# Patient Record
Sex: MALE | Race: WHITE | Employment: OTHER | URBAN - METROPOLITAN AREA
[De-identification: names, ages, dates, MRNs, and addresses within clinical notes are randomized per-mention and may not be internally consistent; named-entity substitution may affect disease eponyms.]

---

## 2017-01-02 ENCOUNTER — TRANSFERRED RECORDS (OUTPATIENT)
Dept: HEALTH INFORMATION MANAGEMENT | Facility: CLINIC | Age: 72
End: 2017-01-02

## 2017-01-13 ENCOUNTER — APPOINTMENT (OUTPATIENT)
Dept: GENERAL RADIOLOGY | Facility: CLINIC | Age: 72
DRG: 194 | End: 2017-01-13
Attending: EMERGENCY MEDICINE
Payer: MEDICARE

## 2017-01-13 ENCOUNTER — TELEPHONE (OUTPATIENT)
Dept: PEDIATRICS | Facility: CLINIC | Age: 72
End: 2017-01-13

## 2017-01-13 ENCOUNTER — HOSPITAL ENCOUNTER (INPATIENT)
Facility: CLINIC | Age: 72
LOS: 2 days | Discharge: HOME | DRG: 194 | End: 2017-01-15
Attending: EMERGENCY MEDICINE | Admitting: FAMILY MEDICINE
Payer: MEDICARE

## 2017-01-13 DIAGNOSIS — R63.0 LOSS OF APPETITE: Primary | ICD-10-CM

## 2017-01-13 DIAGNOSIS — R11.2 NAUSEA AND VOMITING, INTRACTABILITY OF VOMITING NOT SPECIFIED, UNSPECIFIED VOMITING TYPE: ICD-10-CM

## 2017-01-13 DIAGNOSIS — J18.9 PNEUMONIA OF RIGHT LOWER LOBE DUE TO INFECTIOUS ORGANISM: ICD-10-CM

## 2017-01-13 PROBLEM — C34.30 LUNG CANCER, LOWER LOBE (H): Status: ACTIVE | Noted: 2017-01-13

## 2017-01-13 PROBLEM — C34.31 MALIGNANT NEOPLASM OF LOWER LOBE OF RIGHT LUNG (H): Status: ACTIVE | Noted: 2017-01-13

## 2017-01-13 LAB
ALBUMIN SERPL-MCNC: 2.8 G/DL (ref 3.4–5)
ALP SERPL-CCNC: 40 U/L (ref 40–150)
ALT SERPL W P-5'-P-CCNC: 22 U/L (ref 0–70)
ANION GAP SERPL CALCULATED.3IONS-SCNC: 13 MMOL/L (ref 3–14)
ANISOCYTOSIS BLD QL SMEAR: SLIGHT
AST SERPL W P-5'-P-CCNC: 23 U/L (ref 0–45)
BASOPHILS # BLD AUTO: 0 10E9/L (ref 0–0.2)
BASOPHILS NFR BLD AUTO: 0.3 %
BILIRUB SERPL-MCNC: 1.3 MG/DL (ref 0.2–1.3)
BUN SERPL-MCNC: 22 MG/DL (ref 7–30)
CALCIUM SERPL-MCNC: 8.9 MG/DL (ref 8.5–10.1)
CHLORIDE SERPL-SCNC: 101 MMOL/L (ref 94–109)
CO2 SERPL-SCNC: 23 MMOL/L (ref 20–32)
CREAT SERPL-MCNC: 0.99 MG/DL (ref 0.66–1.25)
DIFFERENTIAL METHOD BLD: ABNORMAL
EOSINOPHIL # BLD AUTO: 0 10E9/L (ref 0–0.7)
EOSINOPHIL NFR BLD AUTO: 0.3 %
ERYTHROCYTE [DISTWIDTH] IN BLOOD BY AUTOMATED COUNT: 15.6 % (ref 10–15)
GFR SERPL CREATININE-BSD FRML MDRD: 74 ML/MIN/1.7M2
GLUCOSE SERPL-MCNC: 124 MG/DL (ref 70–99)
HCT VFR BLD AUTO: 37.1 % (ref 40–53)
HGB BLD-MCNC: 11.9 G/DL (ref 13.3–17.7)
IMM GRANULOCYTES # BLD: 0.1 10E9/L (ref 0–0.4)
IMM GRANULOCYTES NFR BLD: 1.3 %
INR PPP: 1.24 (ref 0.86–1.14)
LACTATE BLD-SCNC: 1.1 MMOL/L (ref 0.7–2.1)
LIPASE SERPL-CCNC: 46 U/L (ref 73–393)
LYMPHOCYTES # BLD AUTO: 0.6 10E9/L (ref 0.8–5.3)
LYMPHOCYTES NFR BLD AUTO: 15.4 %
MCH RBC QN AUTO: 30 PG (ref 26.5–33)
MCHC RBC AUTO-ENTMCNC: 32.1 G/DL (ref 31.5–36.5)
MCV RBC AUTO: 94 FL (ref 78–100)
MONOCYTES # BLD AUTO: 0.9 10E9/L (ref 0–1.3)
MONOCYTES NFR BLD AUTO: 23.1 %
NEUTROPHILS # BLD AUTO: 2.3 10E9/L (ref 1.6–8.3)
NEUTROPHILS NFR BLD AUTO: 59.6 %
PLATELET # BLD AUTO: 160 10E9/L (ref 150–450)
PLATELET # BLD EST: NORMAL 10*3/UL
POTASSIUM SERPL-SCNC: 3.6 MMOL/L (ref 3.4–5.3)
PROT SERPL-MCNC: 6.8 G/DL (ref 6.8–8.8)
RBC # BLD AUTO: 3.97 10E12/L (ref 4.4–5.9)
RBC MORPH BLD: NORMAL
SODIUM SERPL-SCNC: 137 MMOL/L (ref 133–144)
WBC # BLD AUTO: 3.8 10E9/L (ref 4–11)

## 2017-01-13 PROCEDURE — 25000125 ZZHC RX 250: Performed by: FAMILY MEDICINE

## 2017-01-13 PROCEDURE — 99285 EMERGENCY DEPT VISIT HI MDM: CPT | Performed by: EMERGENCY MEDICINE

## 2017-01-13 PROCEDURE — 96376 TX/PRO/DX INJ SAME DRUG ADON: CPT

## 2017-01-13 PROCEDURE — 71020 XR CHEST 2 VW: CPT

## 2017-01-13 PROCEDURE — 25000308 HC RX OP HPI UCR WEL MED 250 IP 250: Performed by: FAMILY MEDICINE

## 2017-01-13 PROCEDURE — 25000128 H RX IP 250 OP 636: Performed by: EMERGENCY MEDICINE

## 2017-01-13 PROCEDURE — 83605 ASSAY OF LACTIC ACID: CPT | Performed by: EMERGENCY MEDICINE

## 2017-01-13 PROCEDURE — 85610 PROTHROMBIN TIME: CPT | Performed by: EMERGENCY MEDICINE

## 2017-01-13 PROCEDURE — 99285 EMERGENCY DEPT VISIT HI MDM: CPT | Mod: 25

## 2017-01-13 PROCEDURE — 96365 THER/PROPH/DIAG IV INF INIT: CPT

## 2017-01-13 PROCEDURE — 85025 COMPLETE CBC W/AUTO DIFF WBC: CPT | Performed by: EMERGENCY MEDICINE

## 2017-01-13 PROCEDURE — 83690 ASSAY OF LIPASE: CPT | Performed by: EMERGENCY MEDICINE

## 2017-01-13 PROCEDURE — 94640 AIRWAY INHALATION TREATMENT: CPT

## 2017-01-13 PROCEDURE — 12000007 ZZH R&B INTERMEDIATE

## 2017-01-13 PROCEDURE — 87040 BLOOD CULTURE FOR BACTERIA: CPT | Performed by: EMERGENCY MEDICINE

## 2017-01-13 PROCEDURE — 25000125 ZZHC RX 250: Performed by: EMERGENCY MEDICINE

## 2017-01-13 PROCEDURE — 80053 COMPREHEN METABOLIC PANEL: CPT | Performed by: EMERGENCY MEDICINE

## 2017-01-13 PROCEDURE — 99207 ZZC CDG-CHARGE/DX NOT SELECTED BY PROVIDER: CPT | Performed by: FAMILY MEDICINE

## 2017-01-13 PROCEDURE — 99223 1ST HOSP IP/OBS HIGH 75: CPT | Mod: AI | Performed by: FAMILY MEDICINE

## 2017-01-13 PROCEDURE — 96375 TX/PRO/DX INJ NEW DRUG ADDON: CPT

## 2017-01-13 PROCEDURE — 40000275 ZZH STATISTIC RCP TIME EA 10 MIN

## 2017-01-13 PROCEDURE — 96361 HYDRATE IV INFUSION ADD-ON: CPT

## 2017-01-13 RX ORDER — LEVOFLOXACIN 5 MG/ML
500 INJECTION, SOLUTION INTRAVENOUS ONCE
Status: COMPLETED | OUTPATIENT
Start: 2017-01-13 | End: 2017-01-13

## 2017-01-13 RX ORDER — ONDANSETRON 2 MG/ML
4 INJECTION INTRAMUSCULAR; INTRAVENOUS EVERY 30 MIN PRN
Status: COMPLETED | OUTPATIENT
Start: 2017-01-13 | End: 2017-01-14

## 2017-01-13 RX ORDER — PROCHLORPERAZINE 25 MG
12.5 SUPPOSITORY, RECTAL RECTAL EVERY 12 HOURS PRN
Status: DISCONTINUED | OUTPATIENT
Start: 2017-01-13 | End: 2017-01-15 | Stop reason: HOSPADM

## 2017-01-13 RX ORDER — NALOXONE HYDROCHLORIDE 0.4 MG/ML
.1-.4 INJECTION, SOLUTION INTRAMUSCULAR; INTRAVENOUS; SUBCUTANEOUS
Status: DISCONTINUED | OUTPATIENT
Start: 2017-01-13 | End: 2017-01-15 | Stop reason: HOSPADM

## 2017-01-13 RX ORDER — FOLIC ACID 1 MG/1
1 TABLET ORAL DAILY
Status: DISCONTINUED | OUTPATIENT
Start: 2017-01-14 | End: 2017-01-15 | Stop reason: HOSPADM

## 2017-01-13 RX ORDER — LEVOFLOXACIN 5 MG/ML
750 INJECTION, SOLUTION INTRAVENOUS EVERY 24 HOURS
Status: DISCONTINUED | OUTPATIENT
Start: 2017-01-14 | End: 2017-01-14

## 2017-01-13 RX ORDER — PROMETHAZINE HYDROCHLORIDE 25 MG/ML
25 INJECTION, SOLUTION INTRAMUSCULAR; INTRAVENOUS ONCE
Status: COMPLETED | OUTPATIENT
Start: 2017-01-13 | End: 2017-01-13

## 2017-01-13 RX ORDER — ASPIRIN 81 MG/1
81 TABLET ORAL DAILY
Status: DISCONTINUED | OUTPATIENT
Start: 2017-01-14 | End: 2017-01-15 | Stop reason: HOSPADM

## 2017-01-13 RX ORDER — ALBUTEROL SULFATE 0.83 MG/ML
2.5 SOLUTION RESPIRATORY (INHALATION)
Status: DISCONTINUED | OUTPATIENT
Start: 2017-01-13 | End: 2017-01-15 | Stop reason: HOSPADM

## 2017-01-13 RX ORDER — LEVOFLOXACIN 5 MG/ML
500 INJECTION, SOLUTION INTRAVENOUS ONCE
Status: DISCONTINUED | OUTPATIENT
Start: 2017-01-13 | End: 2017-01-13 | Stop reason: CLARIF

## 2017-01-13 RX ORDER — SODIUM CHLORIDE 9 MG/ML
1000 INJECTION, SOLUTION INTRAVENOUS CONTINUOUS
Status: DISCONTINUED | OUTPATIENT
Start: 2017-01-13 | End: 2017-01-15 | Stop reason: HOSPADM

## 2017-01-13 RX ORDER — UREA 10 %
1000 LOTION (ML) TOPICAL DAILY
Status: DISCONTINUED | OUTPATIENT
Start: 2017-01-14 | End: 2017-01-15 | Stop reason: HOSPADM

## 2017-01-13 RX ORDER — LEVOFLOXACIN 5 MG/ML
250 INJECTION, SOLUTION INTRAVENOUS ONCE
Status: DISCONTINUED | OUTPATIENT
Start: 2017-01-13 | End: 2017-01-13

## 2017-01-13 RX ORDER — DEXAMETHASONE 0.5 MG/1
1 TABLET ORAL EVERY OTHER DAY
Status: DISCONTINUED | OUTPATIENT
Start: 2017-01-14 | End: 2017-01-15 | Stop reason: HOSPADM

## 2017-01-13 RX ORDER — PROCHLORPERAZINE MALEATE 5 MG
10 TABLET ORAL EVERY 6 HOURS PRN
Status: DISCONTINUED | OUTPATIENT
Start: 2017-01-13 | End: 2017-01-15 | Stop reason: HOSPADM

## 2017-01-13 RX ORDER — PROCHLORPERAZINE MALEATE 5 MG
5 TABLET ORAL EVERY 6 HOURS PRN
Status: DISCONTINUED | OUTPATIENT
Start: 2017-01-13 | End: 2017-01-15 | Stop reason: HOSPADM

## 2017-01-13 RX ORDER — CHLORAL HYDRATE 500 MG
2 CAPSULE ORAL DAILY
Status: DISCONTINUED | OUTPATIENT
Start: 2017-01-14 | End: 2017-01-15 | Stop reason: HOSPADM

## 2017-01-13 RX ADMIN — LEVOFLOXACIN 250 MG: 5 INJECTION, SOLUTION INTRAVENOUS at 21:57

## 2017-01-13 RX ADMIN — LEVOFLOXACIN 500 MG: 5 INJECTION, SOLUTION INTRAVENOUS at 19:49

## 2017-01-13 RX ADMIN — ONDANSETRON 4 MG: 2 INJECTION INTRAMUSCULAR; INTRAVENOUS at 17:09

## 2017-01-13 RX ADMIN — ONDANSETRON 4 MG: 2 INJECTION INTRAMUSCULAR; INTRAVENOUS at 18:17

## 2017-01-13 RX ADMIN — ALBUTEROL SULFATE 2.5 MG: 2.5 SOLUTION RESPIRATORY (INHALATION) at 22:13

## 2017-01-13 RX ADMIN — PROMETHAZINE HYDROCHLORIDE 25 MG: 25 INJECTION INTRAMUSCULAR; INTRAVENOUS at 19:20

## 2017-01-13 RX ADMIN — SODIUM CHLORIDE 1000 ML: 9 INJECTION, SOLUTION INTRAVENOUS at 18:42

## 2017-01-13 RX ADMIN — SODIUM CHLORIDE 1000 ML: 9 INJECTION, SOLUTION INTRAVENOUS at 17:04

## 2017-01-13 ASSESSMENT — ENCOUNTER SYMPTOMS
FATIGUE: 1
ACTIVITY CHANGE: 1
CHILLS: 1
COUGH: 1
ABDOMINAL PAIN: 1
LIGHT-HEADEDNESS: 1
NAUSEA: 1
WEAKNESS: 1
DIARRHEA: 1
VOMITING: 1
APPETITE CHANGE: 1

## 2017-01-13 NOTE — TELEPHONE ENCOUNTER
Patient called stating he started with flu like symptoms on Tuesday, slept all day Wednesday, today he has chills off and on, nausea, vomit. Able to keep water down, but no food.     Mariya RODRIGUEZ  Station

## 2017-01-13 NOTE — IP AVS SNAPSHOT
MRN:[id number]                      After Visit Summary   1/13/2017    Juan Choudhary    MRN: [id number]           Thank you!     Thank you for choosing [facility] for your care. Our goal is always to provide you with excellent care. Hearing back from our patients is one way we can continue to improve our services. Please take a few minutes to complete the written survey that you may receive in the mail after you visit with us. Thank you!        Patient Information     Date Of Birth          1945        About your hospital stay     You were admitted on:  January 13, 2017 You last received care in the:  [facility]    You were discharged on:  January 15, 2017        Reason for your hospital stay       Pneumonia right lung                  Who to Call     For medical emergencies, please call 911.  For non-urgent questions about your medical care, please call your primary care provider or clinic, [phone]          Attending Provider     Provider    Riley Nicholson MD Akintola, Olutoyin Enitan, MD       Primary Care Provider Office Phone # Fax #    GIRISH Soto [phone]       [facility] [id number] [place] [address]        After Care Instructions     Activity       Your activity upon discharge: activity as tolerated            Diet       Follow this diet upon discharge: Orders Placed This Encounter  Advance Diet as Tolerated: Clear Liquid Diet                  Follow-up Appointments     Follow-up and recommended labs and tests        Follow up with primary care provider, GIRISH Soto, within 7 days for hospital follow- up.  No follow up labs or test are needed.                  Your next 10 appointments already scheduled     Jan 20, 2017  2:40 PM   Office Visit with Andrea Chau MD   [facility] ([facility])    [address] [address] [phone]           Bring a  "current list of meds and any records pertaining to this visit.  For Physicals, please bring immunization records and any forms needing to be filled out.  Please arrive 10 minutes early to complete paperwork.              Pending Results     Date and Time Order Name Status Description    1/13/2017 1921 Blood culture Preliminary     1/13/2017 1921 Blood culture Preliminary             Statement of Approval     Ordered          01/15/17 1349  I have reviewed and agree with all the recommendations and orders detailed in this document.   EFFECTIVE NOW     Approved and electronically signed by:  Meghann Esqueda MD             Admission Information        Provider Department Dept Phone    1/13/2017 Meghann Esqueda MD [facility] [phone]      Your Vitals Were     Blood Pressure Pulse Temperature    142/86 mmHg 69 99.2  F (37.3  C) (Oral)    Respirations Height Weight    18 1.88 m (6' 2\") 98 kg (216 lb 0.8 oz)    BMI (Body Mass Index) Pulse Oximetry       27.73 kg/m2 97%       Care EveryWhere ID     This is your Care EveryWhere ID. This could be used by other organizations to access your [facility] medical records  [id number]           Review of your medicines      START taking        Dose / Directions    levofloxacin 500 MG tablet   Commonly known as:  LEVAQUIN   Used for:  Pneumonia of right lower lobe due to infectious organism        Dose:  500 mg   Take 1 tablet (500 mg) by mouth daily   Quantity:  5 tablet   Refills:  0       mirtazapine 15 MG tablet   Commonly known as:  REMERON   Used for:  Loss of appetite        Dose:  15 mg   Take 1 tablet (15 mg) by mouth At Bedtime   Quantity:  30 tablet   Refills:  1         CONTINUE these medicines which have NOT CHANGED        Dose / Directions    ASPIRIN EC PO        Dose:  81 mg   Take 81 mg by mouth daily   Refills:  0       B-12 1000 MCG Caps        Dose:  1000 mcg   Take 1,000 mcg by mouth daily   Refills:  0       DECADRON PO        " Dose:  1 mg   Take 1 mg by mouth every other day   Refills:  0       fish oil-omega-3 fatty acids 1000 MG capsule        Dose:  2 g   Take 2 g by mouth daily   Refills:  0       folic acid 1 MG tablet   Commonly known as:  FOLVITE        Dose:  1 mg   Take 1 mg by mouth daily.   Refills:  0       NEW MED        Tart cherry juice  1oz daily   Refills:  0       PROCHLORPERAZINE MALEATE PO        Dose:  10 mg   Take 10 mg by mouth as needed for nausea or vomiting   Refills:  0       TAXOTERE IV        Refills:  0       VITAMIN D PO        Dose:  1000 Units   Take 1,000 Units by mouth daily   Refills:  0            Where to get your medicines      These medications were sent to [facility] [place], [place] - [address]  [address] [address]     Phone:  [phone]    - levofloxacin 500 MG tablet  - mirtazapine 15 MG tablet             Protect others around you: Learn how to safely use, store and throw away your medicines at www.disposemymeds.org.             Medication List: This is a list of all your medications and when to take them. Check marks below indicate your daily home schedule. Keep this list as a reference.      Medications           Morning Afternoon Evening Bedtime As Needed    ASPIRIN EC PO   Take 81 mg by mouth daily   Last time this was given:  81 mg on 1/15/2017  8:46 AM                                B-12 1000 MCG Caps   Take 1,000 mcg by mouth daily                                DECADRON PO   Take 1 mg by mouth every other day   Last time this was given:  1 mg on 1/14/2017  8:06 AM                                fish oil-omega-3 fatty acids 1000 MG capsule   Take 2 g by mouth daily   Last time this was given:  2 g on 1/14/2017  8:07 AM                                folic acid 1 MG tablet   Commonly known as:  FOLVITE   Take 1 mg by mouth daily.   Last time this was given:  1 mg on 1/15/2017  8:47 AM                                levofloxacin 500 MG tablet    Commonly known as:  LEVAQUIN   Take 1 tablet (500 mg) by mouth daily                                mirtazapine 15 MG tablet   Commonly known as:  REMERON   Take 1 tablet (15 mg) by mouth At Bedtime                                NEW MED   Tart cherry juice  1oz daily                                PROCHLORPERAZINE MALEATE PO   Take 10 mg by mouth as needed for nausea or vomiting                                TAXOTERE IV                                VITAMIN D PO   Take 1,000 Units by mouth daily   Last time this was given:  1,000 Units on 1/15/2017  8:46 AM

## 2017-01-13 NOTE — TELEPHONE ENCOUNTER
Patient states he has had nausea/vomiting, diarrhea, with chills off and on, and weakness since Tuesday. He is able to keep down some water and is still urinating but just feels very weak. Has not been able to keep down food since Tuesday.  He denies any chest pain, sore throat, headache or cough. He will get a ride to the UC[facility] to be assessed and possibly get fluids.    Arin Bautista RN

## 2017-01-13 NOTE — IP AVS SNAPSHOT
[facility]    [address] [address]    Phone:  [phone]    Fax:  [phone]                                       After Visit Summary   1/13/2017    Juan Choudhary    MRN: [id number]           After Visit Summary Signature Page     I have received my discharge instructions, and my questions have been answered. I have discussed any challenges I see with this plan with the nurse or doctor.    ..........................................................................................................................................  Patient/Patient Representative Signature      ..........................................................................................................................................  Patient Representative Print Name and Relationship to Patient    ..................................................               ................................................  Date                                            Time    ..........................................................................................................................................  Reviewed by Signature/Title    ...................................................              ..............................................  Date                                                            Time

## 2017-01-13 NOTE — ED PROVIDER NOTES
History     Chief Complaint   Patient presents with     Abdominal Pain     N/V/D generalized weakness     HPI  Juan Choudhary is a 71 year old male with a history of non-small cell lung cancer who presents to the ED today with complaints of nausea and vomiting. This started Tuesday 1/10/17 when he came in from the cold and felt like he couldn't warm up. He had the chills and went to bed early. All day Wednesday he slept, did not eat, and drank ice water. Thursday he stayed in bed most of the day, and tried to eat soup in the evening, but he vomited that up. He has been nauseous and vomiting since then. He also has had dark diarrhea, but is still urinating normally. He has had a moderate cough and has noted mild edema in his legs.  He complains of lightheadedness and weakness since this started.  He denies an fever, cough, SOB, chest pain, abdominal pain, history of abdominal surgeries or problems, or hematuria.   Of note, he has had a colonoscopy in 2006 that came back completely normal. He also had a dose of chemo therapy a weak and a half ago.       I have reviewed the Medications, Allergies, Past Medical and Surgical History, and Social History in the [employer] system.  Past Medical History   Diagnosis Date     Esophageal reflux      Gastroesophageal reflux     Patient Active Problem List   Diagnosis     ESOPHAGEAL REFLUX     Internal bleeding hemorrhoids     CARDIOVASCULAR SCREENING; LDL GOAL LESS THAN 160     Adenocarcinoma of lung (H)     Advanced directives, counseling/discussion     Health Care Home     Current Facility-Administered Medications   Medication     0.9% sodium chloride infusion     ondansetron (ZOFRAN) injection 4 mg     levofloxacin (LEVAQUIN) infusion 500 mg     Current Outpatient Prescriptions   Medication     DOCEtaxel (TAXOTERE IV)     Dexamethasone (DECADRON PO)     vitamin  B complex with vitamin C (VITAMIN  B COMPLEX) TABS     fish oil-omega-3 fatty acids 1000 MG capsule     GLUCOSAMINE  SULFATE PO     Cholecalciferol (VITAMIN D PO)     folic acid (FOLVITE) 1 MG tablet     ranitidine (ZANTAC) 150 MG tablet     NEW MED     senna-docusate (SENOKOT-S;PERICOLACE) 8.6-50 MG per tablet     PROCHLORPERAZINE MALEATE PO     NEW MED        Allergies   Allergen Reactions     Penicillins      Mild rash, cephlaosporins okay     Prilosec [Omeprazole]      Legs itching     Sulfa Drugs      Social History     Social History     Marital Status:      Spouse Name: N/A     Number of Children: N/A     Years of Education: N/A     Occupational History     Not on file.     Social History Main Topics     Smoking status: Never Smoker      Smokeless tobacco: Never Used     Alcohol Use: Yes      Comment: socially     Drug Use: No     Sexual Activity: No     Other Topics Concern     Not on file     Social History Narrative     Family History   Problem Relation Age of Onset     CANCER Mother      lung     HEART DISEASE Father      HEART DISEASE Sister      Prostate Cancer Brother      Melanoma No family hx of         Review of Systems   Constitutional: Positive for chills, activity change, appetite change (decreased) and fatigue.   Respiratory: Positive for cough.    Cardiovascular: Positive for leg swelling.   Gastrointestinal: Positive for nausea, vomiting, abdominal pain and diarrhea.   Neurological: Positive for weakness and light-headedness.     All other systems reviewed and are negative.    Physical Exam   BP: 128/87 mmHg  Pulse: 85  Heart Rate: 85  Temp: 97.8  F (36.6  C)  Resp: 20  SpO2: 96 %  Physical Exam Gen. alert cooperative male in moderate distress.  Facial pallor.  HEENT reveals alopecia.  No scleral icterus.  Oral mucosa is moist.  Neck is supple.  Lungs reveal rhonchi at the right base.  No wheezes part.  Cardiac regular rate without murmur.  Back no CVA tenderness.  Abdomen reveals active bowel sounds and palpation and he is tender in the epigastrium without guarding or rebound.  There is no  organomegaly noted.  No skin rashes.  Extremities reveal no pitting edema.  Negative Homans.  Neurologic nonfocal.    ED Course   Procedures         Results for orders placed or performed during the hospital encounter of 01/13/17   XR Chest 2 Views    Narrative    CHEST TWO VIEWS  1/13/2017 5:51 PM     HISTORY: Cough. Generalized weakness.    COMPARISON: 6/5/2015.      Impression    IMPRESSION: Infiltrate and consolidation at the right lung base is  suspicious for pneumonia. This finding has increased in prominence  since 6/5/2015. A pulmonary mass in this location is considered less  likely from this appearance, however follow-up after treatment is  recommended to ensure resolution of this finding. CT should also be  considered for further characterization. The left lung is clear. There  is a small right pleural effusion.            Critical Care time:  none               Labs Ordered and Resulted from Time of ED Arrival Up to the Time of Departure from the ED   CBC WITH PLATELETS DIFFERENTIAL - Abnormal; Notable for the following:     WBC 3.8 (*)     RBC Count 3.97 (*)     Hemoglobin 11.9 (*)     Hematocrit 37.1 (*)     RDW 15.6 (*)     Absolute Lymphocytes 0.6 (*)     All other components within normal limits   COMPREHENSIVE METABOLIC PANEL - Abnormal; Notable for the following:     Glucose 124 (*)     Albumin 2.8 (*)     All other components within normal limits   LIPASE - Abnormal; Notable for the following:     Lipase 46 (*)     All other components within normal limits   INR - Abnormal; Notable for the following:     INR 1.24 (*)     All other components within normal limits   LACTIC ACID WHOLE BLOOD   URINE MACROSCOPIC WITH REFLEX TO MICRO   BLOOD CULTURE   BLOOD CULTURE     Results for orders placed or performed during the hospital encounter of 01/13/17 (from the past 24 hour(s))   CBC with platelets differential   Result Value Ref Range    WBC 3.8 (L) 4.0 - 11.0 10e9/L    RBC Count 3.97 (L) 4.4 - 5.9  10e12/L    Hemoglobin 11.9 (L) 13.3 - 17.7 g/dL    Hematocrit 37.1 (L) 40.0 - 53.0 %    MCV 94 78 - 100 fl    MCH 30.0 26.5 - 33.0 pg    MCHC 32.1 31.5 - 36.5 g/dL    RDW 15.6 (H) 10.0 - 15.0 %    Platelet Count 160 150 - 450 10e9/L    Diff Method Automated Method     % Neutrophils 59.6 %    % Lymphocytes 15.4 %    % Monocytes 23.1 %    % Eosinophils 0.3 %    % Basophils 0.3 %    % Immature Granulocytes 1.3 %    Absolute Neutrophil 2.3 1.6 - 8.3 10e9/L    Absolute Lymphocytes 0.6 (L) 0.8 - 5.3 10e9/L    Absolute Monocytes 0.9 0.0 - 1.3 10e9/L    Absolute Eosinophils 0.0 0.0 - 0.7 10e9/L    Absolute Basophils 0.0 0.0 - 0.2 10e9/L    Abs Immature Granulocytes 0.1 0 - 0.4 10e9/L    Anisocytosis Slight     RBC Morphology Normal     Platelet Estimate Normal    Comprehensive metabolic panel   Result Value Ref Range    Sodium 137 133 - 144 mmol/L    Potassium 3.6 3.4 - 5.3 mmol/L    Chloride 101 94 - 109 mmol/L    Carbon Dioxide 23 20 - 32 mmol/L    Anion Gap 13 3 - 14 mmol/L    Glucose 124 (H) 70 - 99 mg/dL    Urea Nitrogen 22 7 - 30 mg/dL    Creatinine 0.99 0.66 - 1.25 mg/dL    GFR Estimate 74 >60 mL/min/1.7m2    GFR Estimate If Black 89 >60 mL/min/1.7m2    Calcium 8.9 8.5 - 10.1 mg/dL    Bilirubin Total 1.3 0.2 - 1.3 mg/dL    Albumin 2.8 (L) 3.4 - 5.0 g/dL    Protein Total 6.8 6.8 - 8.8 g/dL    Alkaline Phosphatase 40 40 - 150 U/L    ALT 22 0 - 70 U/L    AST 23 0 - 45 U/L   Lipase   Result Value Ref Range    Lipase 46 (L) 73 - 393 U/L   Lactic acid whole blood   Result Value Ref Range    Lactic Acid 1.1 0.7 - 2.1 mmol/L   INR   Result Value Ref Range    INR 1.24 (H) 0.86 - 1.14   XR Chest 2 Views    Narrative    CHEST TWO VIEWS  1/13/2017 5:51 PM     HISTORY: Cough. Generalized weakness.    COMPARISON: 6/5/2015.      Impression    IMPRESSION: Infiltrate and consolidation at the right lung base is  suspicious for pneumonia. This finding has increased in prominence  since 6/5/2015. A pulmonary mass in this location is  considered less  likely from this appearance, however follow-up after treatment is  recommended to ensure resolution of this finding. CT should also be  considered for further characterization. The left lung is clear. There  is a small right pleural effusion.       Medications   0.9% sodium chloride BOLUS (0 mLs Intravenous Stopped 1/13/17 1841)     Followed by   0.9% sodium chloride infusion (1,000 mLs Intravenous New Bag 1/13/17 1842)   ondansetron (ZOFRAN) injection 4 mg (4 mg Intravenous Given 1/13/17 1817)   levofloxacin (LEVAQUIN) infusion 500 mg (not administered)   promethazine (PHENERGAN) IV injection 25 mg (25 mg Intravenous Given 1/13/17 1920)       4:50 PM Patient Assessed    Patient was given IV fluids, antiemetics, blood work was ordered and chest x-rays were ordered.  Assessments & Plan (with Medical Decision Making)   Patient is a 71-year-old male presents with 4 days of generalized weakness, coarse cough, nausea and vomiting.  Patient denies dizziness but feels weak when standing.  He has no chest pain and has had mild shortness of breath.  Cough is nonproductive.  He has lung cancer and is currently going through chemotherapy with last therapy week ago.  Patient denies significant diarrhea.  He's had no dysuria.  No significant abdominal pain but on exam he has tenderness in the epigastrium.  No guarding or rebound.  With blood work there is no evidence of hepatitis, biliary disease or pancreatitis.  His lipase was normal.  On lung exam he did have walk at the right base.  X-ray of the chest shows infiltrate at the right base consistent with exam.  Patient was given IV fluids and required multiple doses of antiemetics.  He has a penicillin allergy so he is given IV Levaquin.  Blood cultures were sent prior to initiation of antibiotics.  With newly diagnosed pneumonia, having lung cancer, currently treated with chemotherapy, and persistent cough and nausea will permit the patient for additional IV  fluids, antiemetics and antibiotics.  Patient's white count is 3.8 with a normal differential.  Doubt neutropenic but can follow serial white counts if necessary.  Dr. Esqueda was apprised and will follow on admission.  I have reviewed the nursing notes.    I have reviewed the findings, diagnosis, plan and need for follow up with the patient.    New Prescriptions    No medications on file       Final diagnoses:   Pneumonia of right lower lobe due to infectious organism   Nausea and vomiting, intractability of vomiting not specified, unspecified vomiting type     This document serves as a record of the services and decisions personally performed and made by Riley Nicholson MD. It was created on HIS/HER behalf by Roselyn Cox, a trained medical scribe. The creation of this document is based the provider's statements to the medical scribe.  Roselyn Cox 4:50 PM 1/13/2017    Provider:   The information in this document, created by the medical scribe for me, accurately reflects the services I personally performed and the decisions made by me. I have reviewed and approved this document for accuracy prior to leaving the patient care area.  Riley Nicholson MD 4:50 PM 1/13/2017 1/13/2017   [facility] EMERGENCY DEPARTMENT      Riley Nicholson MD  01/13/17 2007    Riley Nicholson MD  01/13/17 2008

## 2017-01-14 LAB
ALBUMIN UR-MCNC: 30 MG/DL
ANION GAP SERPL CALCULATED.3IONS-SCNC: 9 MMOL/L (ref 3–14)
ANISOCYTOSIS BLD QL SMEAR: SLIGHT
APPEARANCE UR: CLEAR
BASOPHILS # BLD AUTO: 0 10E9/L (ref 0–0.2)
BASOPHILS NFR BLD AUTO: 0 %
BILIRUB UR QL STRIP: NEGATIVE
BUN SERPL-MCNC: 17 MG/DL (ref 7–30)
CALCIUM SERPL-MCNC: 8.1 MG/DL (ref 8.5–10.1)
CHLORIDE SERPL-SCNC: 107 MMOL/L (ref 94–109)
CO2 SERPL-SCNC: 26 MMOL/L (ref 20–32)
COLOR UR AUTO: YELLOW
CREAT SERPL-MCNC: 0.92 MG/DL (ref 0.66–1.25)
DIFFERENTIAL METHOD BLD: ABNORMAL
EOSINOPHIL # BLD AUTO: 0 10E9/L (ref 0–0.7)
EOSINOPHIL NFR BLD AUTO: 0.2 %
ERYTHROCYTE [DISTWIDTH] IN BLOOD BY AUTOMATED COUNT: 15.2 % (ref 10–15)
GFR SERPL CREATININE-BSD FRML MDRD: 81 ML/MIN/1.7M2
GLUCOSE SERPL-MCNC: 95 MG/DL (ref 70–99)
GLUCOSE UR STRIP-MCNC: NEGATIVE MG/DL
HCT VFR BLD AUTO: 31.9 % (ref 40–53)
HGB BLD-MCNC: 10.2 G/DL (ref 13.3–17.7)
HGB UR QL STRIP: NEGATIVE
IMM GRANULOCYTES # BLD: 0.1 10E9/L (ref 0–0.4)
IMM GRANULOCYTES NFR BLD: 1.5 %
KETONES UR STRIP-MCNC: 80 MG/DL
LEUKOCYTE ESTERASE UR QL STRIP: NEGATIVE
LYMPHOCYTES # BLD AUTO: 0.7 10E9/L (ref 0.8–5.3)
LYMPHOCYTES NFR BLD AUTO: 17.7 %
MCH RBC QN AUTO: 29.9 PG (ref 26.5–33)
MCHC RBC AUTO-ENTMCNC: 32 G/DL (ref 31.5–36.5)
MCV RBC AUTO: 94 FL (ref 78–100)
MONOCYTES # BLD AUTO: 0.8 10E9/L (ref 0–1.3)
MONOCYTES NFR BLD AUTO: 20.9 %
MUCOUS THREADS #/AREA URNS LPF: PRESENT /LPF
NEUTROPHILS # BLD AUTO: 2.4 10E9/L (ref 1.6–8.3)
NEUTROPHILS NFR BLD AUTO: 59.7 %
NITRATE UR QL: NEGATIVE
PH UR STRIP: 5.5 PH (ref 5–7)
PLATELET # BLD AUTO: 139 10E9/L (ref 150–450)
PLATELET # BLD EST: ABNORMAL 10*3/UL
POTASSIUM SERPL-SCNC: 3.6 MMOL/L (ref 3.4–5.3)
RBC # BLD AUTO: 3.41 10E12/L (ref 4.4–5.9)
RBC #/AREA URNS AUTO: 1 /HPF (ref 0–2)
RBC MORPH BLD: NORMAL
SODIUM SERPL-SCNC: 142 MMOL/L (ref 133–144)
SP GR UR STRIP: 1.02 (ref 1–1.03)
URN SPEC COLLECT METH UR: ABNORMAL
UROBILINOGEN UR STRIP-MCNC: NORMAL MG/DL (ref 0–2)
WBC # BLD AUTO: 4 10E9/L (ref 4–11)
WBC #/AREA URNS AUTO: 1 /HPF (ref 0–2)

## 2017-01-14 PROCEDURE — 25000132 ZZH RX MED GY IP 250 OP 250 PS 637: Mod: GY | Performed by: FAMILY MEDICINE

## 2017-01-14 PROCEDURE — 25000308 HC RX OP HPI UCR WEL MED 250 IP 250: Performed by: FAMILY MEDICINE

## 2017-01-14 PROCEDURE — 99207 ZZC CDG-CHARGE/DX NOT SELECTED BY PROVIDER: CPT | Performed by: FAMILY MEDICINE

## 2017-01-14 PROCEDURE — A9270 NON-COVERED ITEM OR SERVICE: HCPCS | Mod: GY | Performed by: FAMILY MEDICINE

## 2017-01-14 PROCEDURE — 94640 AIRWAY INHALATION TREATMENT: CPT | Mod: 76

## 2017-01-14 PROCEDURE — 25000128 H RX IP 250 OP 636: Performed by: EMERGENCY MEDICINE

## 2017-01-14 PROCEDURE — 25000125 ZZHC RX 250: Performed by: FAMILY MEDICINE

## 2017-01-14 PROCEDURE — 12000000 ZZH R&B MED SURG/OB

## 2017-01-14 PROCEDURE — 85025 COMPLETE CBC W/AUTO DIFF WBC: CPT | Performed by: FAMILY MEDICINE

## 2017-01-14 PROCEDURE — 25000125 ZZHC RX 250: Performed by: EMERGENCY MEDICINE

## 2017-01-14 PROCEDURE — 36415 COLL VENOUS BLD VENIPUNCTURE: CPT | Performed by: FAMILY MEDICINE

## 2017-01-14 PROCEDURE — 81001 URINALYSIS AUTO W/SCOPE: CPT | Performed by: FAMILY MEDICINE

## 2017-01-14 PROCEDURE — 99232 SBSQ HOSP IP/OBS MODERATE 35: CPT | Performed by: FAMILY MEDICINE

## 2017-01-14 PROCEDURE — 94640 AIRWAY INHALATION TREATMENT: CPT

## 2017-01-14 PROCEDURE — 12000007 ZZH R&B INTERMEDIATE

## 2017-01-14 PROCEDURE — 80048 BASIC METABOLIC PNL TOTAL CA: CPT | Performed by: FAMILY MEDICINE

## 2017-01-14 RX ORDER — MIRTAZAPINE 15 MG/1
15 TABLET, FILM COATED ORAL AT BEDTIME
Status: DISCONTINUED | OUTPATIENT
Start: 2017-01-14 | End: 2017-01-15 | Stop reason: HOSPADM

## 2017-01-14 RX ORDER — LEVOFLOXACIN 5 MG/ML
750 INJECTION, SOLUTION INTRAVENOUS EVERY 24 HOURS
Status: DISCONTINUED | OUTPATIENT
Start: 2017-01-14 | End: 2017-01-15 | Stop reason: HOSPADM

## 2017-01-14 RX ADMIN — Medication 1000 MCG: at 08:03

## 2017-01-14 RX ADMIN — ALBUTEROL SULFATE 2.5 MG: 2.5 SOLUTION RESPIRATORY (INHALATION) at 15:47

## 2017-01-14 RX ADMIN — SODIUM CHLORIDE 1000 ML: 9 INJECTION, SOLUTION INTRAVENOUS at 23:27

## 2017-01-14 RX ADMIN — Medication 2 G: at 08:07

## 2017-01-14 RX ADMIN — SODIUM CHLORIDE 1000 ML: 9 INJECTION, SOLUTION INTRAVENOUS at 00:03

## 2017-01-14 RX ADMIN — ASPIRIN 81 MG: 81 TABLET, COATED ORAL at 08:02

## 2017-01-14 RX ADMIN — LEVOFLOXACIN 750 MG: 5 INJECTION, SOLUTION INTRAVENOUS at 19:49

## 2017-01-14 RX ADMIN — ONDANSETRON 4 MG: 2 INJECTION INTRAMUSCULAR; INTRAVENOUS at 06:56

## 2017-01-14 RX ADMIN — VITAMIN D, TAB 1000IU (100/BT) 1000 UNITS: 25 TAB at 08:06

## 2017-01-14 RX ADMIN — DEXAMETHASONE 1 MG: 0.5 TABLET ORAL at 08:06

## 2017-01-14 RX ADMIN — FOLIC ACID 1 MG: 1 TABLET ORAL at 08:08

## 2017-01-14 RX ADMIN — ALBUTEROL SULFATE 2.5 MG: 2.5 SOLUTION RESPIRATORY (INHALATION) at 21:07

## 2017-01-14 RX ADMIN — ALBUTEROL SULFATE 2.5 MG: 2.5 SOLUTION RESPIRATORY (INHALATION) at 08:22

## 2017-01-14 RX ADMIN — SODIUM CHLORIDE 1000 ML: 9 INJECTION, SOLUTION INTRAVENOUS at 08:03

## 2017-01-14 NOTE — PROGRESS NOTES
[facility] ADMISSION NOTE    Patient admitted to room 2300 at approximately 2140 via cart from emergency room. Patient was accompanied by transport tech.     Verbal SBAR report received from RN prior to patient arrival.     Patient ambulated to bed with stand-by assist. Patient alert and oriented X 3. The patient is not having any pain. 0-10 Pain Scale: 5. Admission vital signs: Blood pressure 131/87, pulse 86, temperature 98.3  F (36.8  C), temperature source Oral, resp. rate 12, SpO2 96 %. Patient was oriented to plan of care, call light, bed controls, tv, telephone, bathroom and visiting hours.     The following safety risks were identified during admission: fall. Yellow risk band applied: YES.     Jenny Crain

## 2017-01-14 NOTE — PROGRESS NOTES
"CLINICAL NUTRITION SERVICES  -  ASSESSMENT NOTE    RECOMMENDATIONS FOR MD/PROVIDER TO ORDER: advance diet when medically appropriate   Recommendations Ordered by Registered Dietitian (RD): none   Future/Additional Recommendations: encourage pt to order bedtime snack   Malnutrition: severe     REASON FOR ASSESSMENT  Juan Choudhary is a 71 year old male seen by Registered Dietitian for Provider Order - poor oral intake    NUTRITION HISTORY  - Information obtained from patient.  Pt states appetite and PO intake were normal prior to Tuesday.  Pt states he usually consumes 3 meals and a bedtime snack at home.  Pt states he has only had a few bites of food since Tuesday (applesauce, soup) secondary to nausea/vomiting.  Pt states his usual weight is ~220lbs and feels he lost ~10lbs since Tuesday.      CURRENT NUTRITION ORDERS  Diet Order:     Clear Liquid.  Pt states he tolerated ~50% of breakfast tray and ~25% of lunch tray.  Pt feels his nausea is improved and appetite is slightly improved.      PHYSICAL FINDINGS  Observed  No nutrition-related physical findings observed  Obtained from Chart/Interdisciplinary Team  None noted    ANTHROPOMETRICS  Height: 6' 2\"  Weight: 209 lbs 3.46 oz  Body mass index is 26.85 kg/(m^2).  Weight Status:  Overweight BMI 25-29.9  IBW: 190lbs  % IBW: 110%  Weight History:   Wt Readings from Last 5 Encounters:   01/14/17 94.9 kg (209 lb 3.5 oz)   02/22/16 99.338 kg (219 lb)   12/11/15 99.428 kg (219 lb 3.2 oz)   09/28/15 97.523 kg (215 lb)   04/02/15 98.34 kg (216 lb 12.8 oz)     Pt has lost ~5% of body weight in past week, per his recall of usual weight of 220lbs.    LABS  Labs reviewed    MEDICATIONS  Medications reviewed  remeron and decadron - both may help improve pt appetite.  Noted vitamins of D3, B12, folic acid and fish oil.  IVF@125mL/hr    Dosing Weight 95kg    ASSESSED NUTRITION NEEDS:  Estimated Energy Needs: 2446-3462 kcals ([place] [employer] Jeor)  Justification: maintenance  Estimated " Protein Needs:  grams protein (1-1.2 g pro/Kg)  Justification: maintenance and hypercatabolism with acute illness  Estimated Fluid Needs: 7358-6252  mL (1 mL/Kcal)  Justification: maintenance    MALNUTRITION:  % Weight Loss:  > 2% in 1 week (severe malnutrition)  % Intake:  </= 50% for >/= 5 days (severe malnutrition)  Subcutaneous Fat Loss:  None observed  Muscle Loss:  None observed  Fluid Retention:  None noted    Malnutrition Diagnosis: Severe malnutrition  In Context of:  Acute illness or injury    NUTRITION DIAGNOSIS:  Inadequate protein-energy intake related to nausea/vomiting as evidenced by minimal PO intake and weight loss of ~10lb since Tuesday    NUTRITION INTERVENTIONS  Recommendations / Nutrition Prescription  Advance diet as medically able  Encourage frequent meals and snacks  Encourage pt to order a bedtime snack of his choice  Consider nutrition supplements if PO intake remains poor after diet advancement    Implementation  Nutrition education: brief education on importance of frequent meals and snacks  General/healthful diet    Nutrition Goals  Pt to consume >75% of most meals and snacks in next 48 hours    MONITORING AND EVALUATION:  Diet Order, Food intake and Weight    Elsie Whalen MS, RDN, LD, CLT  Clinical Dietitian

## 2017-01-14 NOTE — PLAN OF CARE
Problem: Pneumonia (Adult)  Goal: Signs and Symptoms of Listed Potential Problems Will be Absent or Manageable (Pneumonia)  Signs and symptoms of listed potential problems will be absent or manageable by discharge/transition of care (reference Pneumonia (Adult) CPG).   Outcome: Improving  patient states breathing better today sat on room air 91 to 95 % lung sounds few crackles on the right side will continue to monitor

## 2017-01-14 NOTE — PROGRESS NOTES
[facility] Practice Progress Note           Assessment and Plan:   Principal Problem:  Right lower lobe pneumonia  Assessment: 71 yr old male with right lower pneumonia  Plan: Levaquin 750 mg , Neb treatment.  1/14/2017 - On Levaquin 750 mg daily       Active Problems:  Adenocarcinoma of lung (H)  Assessment: 71 yr old male with adenocarcinoma of Lung here with pneumonia  Plan: Continue with treatment  1/14/2017 - No change.        Malignant neoplasm of lower lobe of right lung (H)  Assessment: 71 yr old male here for pneumonia  Plan: IV antibiotics ( Levaquin . IV fluids, neb treatment. Oxygen as needed.  1/14/2017 - Continue Levaquin ,neb treatments.      Disposition- Fair  Anticipate 2-3 nights of admission  DVT prophylaxis- Mechanical.         Interval History:   Continues to improve.  Vital signs generally better.  Eating and voiding well.  Tolerating medications without significant side effects.  No new concerns today.  Nausea better,no fevers, oxygen within normal limits. Appetite is still poor.will add Remeron to see if this will help to boost appetite.             Significant Problems:   Principal Problem:    Right lower lobe pneumonia  Active Problems:    Adenocarcinoma of lung (H)    Malignant neoplasm of lower lobe of right lung (H)    Lung cancer, lower lobe (H)             Review of Systems:   The Review of Systems is negative other than noted in the HPI            Medications:       levofloxacin  750 mg Intravenous Q24H     mirtazapine  15 mg Oral At Bedtime     aspirin EC EC tablet 81 mg  81 mg Oral Daily     cholecalciferol  1,000 Units Oral Daily     cyanocolbalamin  1,000 mcg Oral Daily     dexamethasone  1 mg Oral Every Other Day     fish oil-omega-3 fatty acids  2 g Oral Daily     folic acid  1 mg Oral Daily     albuterol  2.5 mg Nebulization 4x daily             Physical Exam:   Vitals were reviewed  Temp: 98.8  F (37.1  C) Temp src: Oral BP: 118/55 mmHg Pulse: 78 Heart Rate: 81  Resp: 18 SpO2: 96 % O2 Device: None (Room air)    Blood pressure range: Systolic (24hrs), Av mmHg, Min:118 mmHg, Max:158 mmHg  Blood pressure range: Diastolic (24hrs), Av mmHg, Min:55 mmHg, Max:104 mmHg    Intake/Output Summary (Last 24 hours) at 17 1045  Last data filed at 17 1000   Gross per 24 hour   Intake 2382.92 ml   Output      0 ml   Net 2382.92 ml     Constitutional:   awake, alert, cooperative, no apparent distress, and appears stated age     Lungs:   No increased work of breathing, good air exchange, clear to auscultation bilaterally, no crackles or wheezing     Cardiovascular:   Normal apical impulse, regular rate and rhythm, normal S1 and S2, no S3 or S4, and no murmur noted     Abdomen:   No scars, normal bowel sounds, soft, non-distended, non-tender, no masses palpated, no hepatosplenomegally     Musculoskeletal:   no lower extremity pitting edema present     Neuropsychiatric:   General: normal, calm and normal eye contact  Level of consciousness: alert / normal  Affect: normal     Skin:   no bruising or bleeding             Data:     No results found for: NTBNPI, NTBNP  Lab Results   Component Value Date    WBC 4.0 2017    WBC 3.8* 2017    WBC 4.4 2015    HGB 10.2* 2017    HGB 11.9* 2017    HGB 10.6* 2015    HCT 31.9* 2017    HCT 37.1* 2017    HCT 37.6* 2015    MCV 94 2017    MCV 94 2017    MCV 88 2015    * 2017     2017     2015     Lab Results   Component Value Date    CR 0.92 2017    CR 0.99 2017    CR 0.92 2016     Lab Results   Component Value Date     2017     2017     2015    POTASSIUM 3.6 2017    POTASSIUM 3.6 2017    POTASSIUM 4.4 2016    CHLORIDE 107 2017    CHLORIDE 101 2017    CHLORIDE 108 2015    CO2 26 2017    CO2 23 2017    CO2 28 2015    GLC 95  01/14/2017    * 01/13/2017    GLC 76 06/20/2016     Lab Results   Component Value Date    GLC 95 01/14/2017    * 01/13/2017    GLC 76 06/20/2016     Lab Results   Component Value Date    URINEKETONE 80* 01/14/2017    URINEKETONE Negative 06/05/2015          Attestation:  I have reviewed today's vital signs, notes, medications, labs and imaging.  Amount of time performed on this daily note: 20 minutes.    Meghann Esqueda MD

## 2017-01-14 NOTE — H&P
[facility]   History and Physical    Juan Choudhary MRN# [id number]   Age: 71 year old YOB: 1945     Date of Admission:  1/13/2017    Home clinic: [facility]  Primary care provider: GIRISH Soto         Assessment and Plan:   Principal Problem:  Right lower lobe pneumonia  Assessment: 71 yr old male with right lower pneumonia  Plan: Levaquin 750 mg , Neb treatment.    Active Problems:  Adenocarcinoma of lung (H)  Assessment: 71 yr old male with adenocarcinoma of Lung here with pneumonia  Plan: Continue with treatment        Malignant neoplasm of lower lobe of right lung (H)  Assessment: 71 yr old male here for pneumonia  Plan: IV antibiotics ( Levaquin . IV fluids, neb treatment. Oxygen as needed.     Disposition- Fair  Anticipate 2-3 nights of admission  DVT prophylaxis- Mechanical.         Chief Complaint:   Weakness  Nausea  Vomiting    History is obtained from the patient          History of Present Illness:   This patient is a 71 year old  male with a significant past medical history of malignancy( lung cancer) who presents with the following condition requiring a hospital admission:    Patient is a 71 yr old male with a past medical history that is significant for lung cancer diagnosed in 2012 . Patient follows with [facility] . He is presently on chemotherapy for the lung cancer,recent visit was about a week ago.His symptoms started on Tuesday with profound weakness, nausea and vomiting. He also has some chills but no fevers. Patient reports no cough or shortness of breath. He had some diarrhea with his symptoms. Patient 's wife says he has also not been eating well.   In the ER his work up included labs and x-rays and he was found to have chest x-ray findings that were consistent with a right lower lung pneumonia.          Past Medical History:     Patient Active Problem List    Diagnosis Date Noted     Adenocarcinoma of lung (H) 03/08/2011      "Priority: High     Right lower lobectomy at [facility] 2/11       Health Care Home 12/05/2013     Priority: Medium     Given basic care plan on the visit of 12/5/13       Advanced directives, counseling/discussion 12/07/2012     Priority: Medium     Patient does not have an Advance/Health Care Directive (HCD), given \"What is Advance Care Planning?\" flyer.    Mónica Maurice  December 7, 2012         CARDIOVASCULAR SCREENING; LDL GOAL LESS THAN 160 10/31/2010     Priority: Medium     Internal bleeding hemorrhoids 01/06/2009     Priority: Medium     ESOPHAGEAL REFLUX 02/14/2008     Priority: Medium     Scope Feb 2008               Past Surgical History:      Past Surgical History   Procedure Laterality Date     Hernia repair, inguinal rt/lt  2004     Vasectomy  1980     Surgical history of -   1/11     Bronchoscopy ([facility])     Surgical history of -   2/10     Bronchoscopy [facility]     Lobectomy  2/11     right lower lobe, for adenoca             Social History:     Social History     Social History     Marital Status:      Spouse Name: N/A     Number of Children: N/A     Years of Education: N/A     Occupational History     Not on file.     Social History Main Topics     Smoking status: Never Smoker      Smokeless tobacco: Never Used     Alcohol Use: Yes      Comment: socially     Drug Use: No     Sexual Activity: No     Other Topics Concern     Not on file     Social History Narrative          Family History:     Family History   Problem Relation Age of Onset     CANCER Mother      lung     HEART DISEASE Father      HEART DISEASE Sister      Prostate Cancer Brother      Melanoma No family hx of         Allergies:     Allergies   Allergen Reactions     Penicillins      Mild rash, cephlaosporins okay     Prilosec [Omeprazole]      Legs itching     Sulfa Drugs           Medications:       No current facility-administered medications on file prior to encounter.  Current Outpatient Prescriptions on File Prior " to Encounter:  Dexamethasone (DECADRON PO)    vitamin  B complex with vitamin C (VITAMIN  B COMPLEX) TABS Take 1 tablet by mouth daily   fish oil-omega-3 fatty acids 1000 MG capsule Take 2 g by mouth daily   GLUCOSAMINE SULFATE PO Take 1,500 mg by mouth daily   Cholecalciferol (VITAMIN D PO) Take  by mouth.   folic acid (FOLVITE) 1 MG tablet Take 1 mg by mouth daily.   ranitidine (ZANTAC) 150 MG tablet Take  by mouth daily.   NEW MED Tart cherry juice  1oz daily   senna-docusate (SENOKOT-S;PERICOLACE) 8.6-50 MG per tablet Take 2 tablets by mouth nightly as needed for constipation   PROCHLORPERAZINE MALEATE PO Take 10 mg by mouth as needed for nausea or vomiting   NEW MED Kytrol and taxetere for chemotherapy         Review of Systems:   A 10 point review of systems was performed and all else is negative except as stated in the HPI         Physical Exam:   Initial vitals were reviewed   Blood pressure 148/87, pulse 85, temperature 97.8  F (36.6  C), temperature source Oral, resp. rate 22, SpO2 95 %.  Constitutional:   fatigued, alert, cooperative, mild distress, appears older than stated age, toxic and pale   Eyes:   Lids and lashes normal, pupils equal, round and reactive to light, extra ocular muscles intact, sclera clear, conjunctiva normal   ENT:   normocepalic, without obvious abnormality   Neck:   supple, symmetrical, trachea midline   Hematologic / Lymphatic:   no cervical lymphadenopathy   Back:   symmetric and no curvature   Lungs:   no increased work of breathing, mild air exchange, no retractions and clear to auscultation, crackles right base   Cardiovascular:   Normal apical impulse, regular rate and rhythm, normal S1 and S2, no S3 or S4, and no murmur noted   Abdomen:   normal bowel sounds, soft, non-distended, tenderness noted diffusely and voluntary guarding absent   Genitounirinary:   deferred   Musculoskeletal:   no lower extremity pitting edema present   Neurologic:   Awake, alert, oriented to name,  place and time.     Skin:   no bruising or bleeding          Data:   All laboratory data reviewed    Results for orders placed or performed during the hospital encounter of 01/13/17   XR Chest 2 Views    Narrative    CHEST TWO VIEWS  1/13/2017 5:51 PM     HISTORY: Cough. Generalized weakness.    COMPARISON: 6/5/2015.      Impression    IMPRESSION: Infiltrate and consolidation at the right lung base is  suspicious for pneumonia. This finding has increased in prominence  since 6/5/2015. A pulmonary mass in this location is considered less  likely from this appearance, however follow-up after treatment is  recommended to ensure resolution of this finding. CT should also be  considered for further characterization. The left lung is clear. There  is a small right pleural effusion.   CBC with platelets differential   Result Value Ref Range    WBC 3.8 (L) 4.0 - 11.0 10e9/L    RBC Count 3.97 (L) 4.4 - 5.9 10e12/L    Hemoglobin 11.9 (L) 13.3 - 17.7 g/dL    Hematocrit 37.1 (L) 40.0 - 53.0 %    MCV 94 78 - 100 fl    MCH 30.0 26.5 - 33.0 pg    MCHC 32.1 31.5 - 36.5 g/dL    RDW 15.6 (H) 10.0 - 15.0 %    Platelet Count 160 150 - 450 10e9/L    Diff Method Automated Method     % Neutrophils 59.6 %    % Lymphocytes 15.4 %    % Monocytes 23.1 %    % Eosinophils 0.3 %    % Basophils 0.3 %    % Immature Granulocytes 1.3 %    Absolute Neutrophil 2.3 1.6 - 8.3 10e9/L    Absolute Lymphocytes 0.6 (L) 0.8 - 5.3 10e9/L    Absolute Monocytes 0.9 0.0 - 1.3 10e9/L    Absolute Eosinophils 0.0 0.0 - 0.7 10e9/L    Absolute Basophils 0.0 0.0 - 0.2 10e9/L    Abs Immature Granulocytes 0.1 0 - 0.4 10e9/L    Anisocytosis Slight     RBC Morphology Normal     Platelet Estimate Normal    Comprehensive metabolic panel   Result Value Ref Range    Sodium 137 133 - 144 mmol/L    Potassium 3.6 3.4 - 5.3 mmol/L    Chloride 101 94 - 109 mmol/L    Carbon Dioxide 23 20 - 32 mmol/L    Anion Gap 13 3 - 14 mmol/L    Glucose 124 (H) 70 - 99 mg/dL    Urea Nitrogen 22 7  - 30 mg/dL    Creatinine 0.99 0.66 - 1.25 mg/dL    GFR Estimate 74 >60 mL/min/1.7m2    GFR Estimate If Black 89 >60 mL/min/1.7m2    Calcium 8.9 8.5 - 10.1 mg/dL    Bilirubin Total 1.3 0.2 - 1.3 mg/dL    Albumin 2.8 (L) 3.4 - 5.0 g/dL    Protein Total 6.8 6.8 - 8.8 g/dL    Alkaline Phosphatase 40 40 - 150 U/L    ALT 22 0 - 70 U/L    AST 23 0 - 45 U/L   Lipase   Result Value Ref Range    Lipase 46 (L) 73 - 393 U/L   Lactic acid whole blood   Result Value Ref Range    Lactic Acid 1.1 0.7 - 2.1 mmol/L   INR   Result Value Ref Range    INR 1.24 (H) 0.86 - 1.14            Attestation:  I have reviewed today's vital signs, notes, medications, labs and imaging.     Meghann Esqueda MD

## 2017-01-14 NOTE — PLAN OF CARE
Problem: Goal Outcome Summary  Goal: Goal Outcome Summary  Pt will be at 91% or greater for sats and on room air prior to discharge.

## 2017-01-14 NOTE — PROGRESS NOTES
"SPIRITUAL HEALTH SERVICES  SPIRITUAL ASSESSMENT Progress Note  [facility] - Med/Surg    PRIMARY FOCUS:     Emotional/spiritual/Christian distress    Support for coping    ILLNESS CIRCUMSTANCES:   Reviewed documentation. Reflective conversation shared with Tez and his son and DIL which integrated elements of illness and family narratives.     Context of Serious Illness/Symptom(s) - Pneumonia    Resources for Support - Son and DIL live nearby in [place], [place] - attends [employer]    DISTRESS:     Emotional/Existential/Relational Distress - Tez stated that he has been nauseated and going through the \"blue bags\" since Tuesday.  Today that has slowed down and he's trying some food for the first time in several days.     Spiritual/Voodoo Distress - None    Social/Cultural/Economic Distress - Not addressed    SPIRITUAL/Latter day COPING:     Lutheran/Keena - Mandaen - [facility] in [place]    Spiritual Practice(s) - Welcomed prayer together    Emotional/Existential/Relational Connections - Not addressed    GOALS OF CARE:    Goals of Care - To get better and get back home    Meaning/Sense-Making - Tez talked about his vocational history as an ; his DIL stated that her  is also an  as well as their son.  \"He started quite a legacy.\"    PLAN: I will check back with pt on Monday if still INP    Kwesi Roth M.A, [facility]  Staff   Pager [phone]    "

## 2017-01-15 VITALS
BODY MASS INDEX: 27.73 KG/M2 | SYSTOLIC BLOOD PRESSURE: 142 MMHG | WEIGHT: 216.05 LBS | HEIGHT: 74 IN | OXYGEN SATURATION: 97 % | TEMPERATURE: 99.2 F | RESPIRATION RATE: 18 BRPM | DIASTOLIC BLOOD PRESSURE: 86 MMHG | HEART RATE: 69 BPM

## 2017-01-15 PROCEDURE — 99207 ZZC CDG-CHARGE/DX NOT SELECTED BY PROVIDER: CPT | Performed by: FAMILY MEDICINE

## 2017-01-15 PROCEDURE — 25000132 ZZH RX MED GY IP 250 OP 250 PS 637: Mod: GY | Performed by: FAMILY MEDICINE

## 2017-01-15 PROCEDURE — 25000308 HC RX OP HPI UCR WEL MED 250 IP 250: Performed by: FAMILY MEDICINE

## 2017-01-15 PROCEDURE — 99238 HOSP IP/OBS DSCHRG MGMT 30/<: CPT | Performed by: FAMILY MEDICINE

## 2017-01-15 PROCEDURE — A9270 NON-COVERED ITEM OR SERVICE: HCPCS | Mod: GY | Performed by: FAMILY MEDICINE

## 2017-01-15 PROCEDURE — 25000125 ZZHC RX 250: Performed by: FAMILY MEDICINE

## 2017-01-15 RX ORDER — MIRTAZAPINE 15 MG/1
15 TABLET, FILM COATED ORAL AT BEDTIME
Qty: 30 TABLET | Refills: 1 | Status: SHIPPED | OUTPATIENT
Start: 2017-01-15 | End: 2017-06-01

## 2017-01-15 RX ORDER — LEVOFLOXACIN 500 MG/1
500 TABLET, FILM COATED ORAL DAILY
Qty: 5 TABLET | Refills: 0 | Status: SHIPPED | OUTPATIENT
Start: 2017-01-15 | End: 2017-06-01

## 2017-01-15 RX ADMIN — ASPIRIN 81 MG: 81 TABLET, COATED ORAL at 08:46

## 2017-01-15 RX ADMIN — ALBUTEROL SULFATE 2.5 MG: 2.5 SOLUTION RESPIRATORY (INHALATION) at 12:08

## 2017-01-15 RX ADMIN — OMEPRAZOLE 20 MG: 20 CAPSULE, DELAYED RELEASE ORAL at 13:02

## 2017-01-15 RX ADMIN — PROCHLORPERAZINE EDISYLATE 5 MG: 5 INJECTION INTRAMUSCULAR; INTRAVENOUS at 03:37

## 2017-01-15 RX ADMIN — FOLIC ACID 1 MG: 1 TABLET ORAL at 08:47

## 2017-01-15 RX ADMIN — Medication 1000 MCG: at 08:47

## 2017-01-15 RX ADMIN — VITAMIN D, TAB 1000IU (100/BT) 1000 UNITS: 25 TAB at 08:46

## 2017-01-15 NOTE — PLAN OF CARE
"Problem: Goal Outcome Summary  Goal: Goal Outcome Summary  Outcome: Improving  Pt was restless tonight, still awake at 0400 stated \"I usually go right to sleep but with people in an out of the room it throws me off and I am unable to fall asleep.\"  Pt got up and went for a walk, he also felt a little nauseous so he was given IV Compazine.  Pt returned to his room, closed his door and he said he would try to sleep.  Lung sounds are diminished t/o especially in the RLL with fine crackles also in the RLL, he has a infrequent NP-cough.  Pt does feel as though he is doing better. Will continue to monitor close for changes.        "

## 2017-01-15 NOTE — PROGRESS NOTES
[facility] Practice Progress Note           Assessment and Plan:   Right lower lobe pneumonia  Assessment: 71 yr old male with right lower pneumonia  Plan: Levaquin 750 mg , Neb treatment.  1/14/2017 - On Levaquin 750 mg daily    1/15/2017 - Day 4 on Levaquin , oxygen staying steady. No cough or respiratory distress. Still having lots of nausea with eating. Sometimes vomits. This is being controlled with compazine.       Active Problems:  Adenocarcinoma of lung (H)  Assessment: 71 yr old male with adenocarcinoma of Lung here with pneumonia  Plan: Continue with treatment  1/14/2017 - No change.  1/15/2017 - No change, will follow up with oncologist after this hospital stay.        Malignant neoplasm of lower lobe of right lung (H)  Assessment: 71 yr old male here for pneumonia  Plan: IV antibiotics ( Levaquin . IV fluids, neb treatment. Oxygen as needed.  1/14/2017 - Continue Levaquin ,neb treatments.   1/15/2017 - Patient stable .     Disposition- Fair, may be discharged home today.  DVT prophylaxis- Mechanical.         Interval History:   Continues to improve.  Vital signs generally better.  Eating and voiding well.  Tolerating medications without significant side effects.  No new concerns today.  Patient doing better overall. Still has nausea with eating. Started patient on Remeron last night, will see how he does today with food. If he is able to keep his food down likely may go home today.            Significant Problems:   Principal Problem:    Right lower lobe pneumonia  Active Problems:    Adenocarcinoma of lung (H)    Malignant neoplasm of lower lobe of right lung (H)    Lung cancer, lower lobe (H)             Review of Systems:   The Review of Systems is negative other than noted in the HPI            Medications:       levofloxacin  750 mg Intravenous Q24H     mirtazapine  15 mg Oral At Bedtime     aspirin EC EC tablet 81 mg  81 mg Oral Daily     cholecalciferol  1,000 Units Oral Daily      cyanocolbalamin  1,000 mcg Oral Daily     dexamethasone  1 mg Oral Every Other Day     fish oil-omega-3 fatty acids  2 g Oral Daily     folic acid  1 mg Oral Daily     albuterol  2.5 mg Nebulization 4x daily             Physical Exam:   Vitals were reviewed  Temp: 99.5  F (37.5  C) Temp src: Oral BP: 136/68 mmHg Pulse: 83 Heart Rate: 88 Resp: 18 SpO2: 97 % O2 Device: None (Room air)    Blood pressure range: Systolic (24hrs), Av mmHg, Min:117 mmHg, Max:143 mmHg  Blood pressure range: Diastolic (24hrs), Av mmHg, Min:53 mmHg, Max:81 mmHg    Intake/Output Summary (Last 24 hours) at 01/15/17 0757  Last data filed at 17 2200   Gross per 24 hour   Intake 2297.5 ml   Output      0 ml   Net 2297.5 ml     Constitutional:   awake, alert, cooperative, no apparent distress, and appears stated age     Lungs:   no increased work of breathing, good air exchange, no retractions and crackles right base and right middle lobe     Cardiovascular:   Normal apical impulse, regular rate and rhythm, normal S1 and S2, no S3 or S4, and no murmur noted     Abdomen:   No scars, normal bowel sounds, soft, non-distended, non-tender, no masses palpated, no hepatosplenomegally     Musculoskeletal:   no lower extremity pitting edema present     Neuropsychiatric:   General: normal, calm and normal eye contact  Level of consciousness: alert / normal  Affect: normal     Skin:   no bruising or bleeding             Data:          NA      142   2017  NA      137   2017  NA      141   2015 CHLORIDE      107   2017  CHLORIDE      101   2017  CHLORIDE      108   2015 BUN       17   2017  BUN       22   2017  BUN       16   2015   POTASSIUM      3.6   2017  POTASSIUM      3.6   2017  POTASSIUM      4.4   2016 CO2       26   2017  CO2       23   2017  CO2       28   2015 CR     0.92   2017  CR     0.99   2017  CR     0.92   2016     Lab Results   Component  Value Date    WBC 4.0 01/14/2017    WBC 3.8* 01/13/2017    WBC 4.4 06/05/2015    HGB 10.2* 01/14/2017    HGB 11.9* 01/13/2017    HGB 10.6* 09/28/2015    HCT 31.9* 01/14/2017    HCT 37.1* 01/13/2017    HCT 37.6* 06/05/2015    MCV 94 01/14/2017    MCV 94 01/13/2017    MCV 88 06/05/2015    * 01/14/2017     01/13/2017     06/05/2015     Lab Results   Component Value Date     01/14/2017     01/13/2017     08/24/2015    POTASSIUM 3.6 01/14/2017    POTASSIUM 3.6 01/13/2017    POTASSIUM 4.4 06/20/2016    CHLORIDE 107 01/14/2017    CHLORIDE 101 01/13/2017    CHLORIDE 108 08/24/2015    CO2 26 01/14/2017    CO2 23 01/13/2017    CO2 28 06/05/2015    GLC 95 01/14/2017    * 01/13/2017    GLC 76 06/20/2016     Lab Results   Component Value Date    GLC 95 01/14/2017    * 01/13/2017    GLC 76 06/20/2016     Lab Results   Component Value Date    WBC 4.0 01/14/2017    WBC 3.8* 01/13/2017    WBC 4.4 06/05/2015          Attestation:  I have reviewed today's vital signs, notes, medications, labs and imaging.  Amount of time performed on this daily note: 20 minutes.    Meghann Esqueda MD

## 2017-01-15 NOTE — PLAN OF CARE
Problem: Pneumonia (Adult)  Goal: Signs and Symptoms of Listed Potential Problems Will be Absent or Manageable (Pneumonia)  Signs and symptoms of listed potential problems will be absent or manageable by discharge/transition of care (reference Pneumonia (Adult) CPG).   Outcome: No Change  Patient states breathing is better now than on admission.  96 % on RA.  Independent in room and using call light for needs.

## 2017-01-15 NOTE — PLAN OF CARE
[facility] [facility] DISCHARGE NOTE    Patient discharged to home at 2:19 PM via wheel chair. Accompanied by spouse and staff. Discharge instructions reviewed with spouse, opportunity offered to ask questions. Prescriptions sent to patients preferred pharmacy. All belongings sent with patient.    Nicolás Brown

## 2017-01-15 NOTE — DISCHARGE SUMMARY
[facility] Discharge Summary    Juan Choudhary MRN# [id number]   Age: 71 year old YOB: 1945     Date of Admission:  1/13/2017  Date of Discharge::  1/15/2017  Admitting Physician:  Meghann Esqueda MD  Discharge Physician:  Meghann Esqueda MD             Admission Diagnoses:   Pneumonia of right lower lobe due to infectious organism [J18.9]  Nausea and vomiting, intractability of vomiting not specified, unspecified vomiting type [R11.2]          Principle Discharge Diagnosis:       See hospital course for further active diagnoses addressed during this admission.            Procedures:   No procedures performed during this admission          Medications Prior to Admission:     Prescriptions prior to admission   Medication Sig Dispense Refill Last Dose     DOCEtaxel (TAXOTERE IV)    1/2/2017     ASPIRIN EC PO Take 81 mg by mouth daily   Past Week at Unknown time     Cyanocobalamin (B-12) 1000 MCG CAPS Take 1,000 mcg by mouth daily   Past Week at Unknown time     Dexamethasone (DECADRON PO) Take 1 mg by mouth every other day    Past Week at Unknown time     fish oil-omega-3 fatty acids 1000 MG capsule Take 2 g by mouth daily   Past Week at Unknown time     Cholecalciferol (VITAMIN D PO) Take 1,000 Units by mouth daily    Past Week at Unknown time     folic acid (FOLVITE) 1 MG tablet Take 1 mg by mouth daily.   Past Week at Unknown time     NEW MED Tart cherry juice  1oz daily   Past Week at Unknown time     PROCHLORPERAZINE MALEATE PO Take 10 mg by mouth as needed for nausea or vomiting   Taking             Discharge Medications:     Current Discharge Medication List      START taking these medications    Details   levofloxacin (LEVAQUIN) 500 MG tablet Take 1 tablet (500 mg) by mouth daily  Qty: 5 tablet, Refills: 0    Associated Diagnoses: Pneumonia of right lower lobe due to infectious organism         CONTINUE these medications which have NOT CHANGED    Details   DOCEtaxel  "(TAXOTERE IV)       ASPIRIN EC PO Take 81 mg by mouth daily      Cyanocobalamin (B-12) 1000 MCG CAPS Take 1,000 mcg by mouth daily      Dexamethasone (DECADRON PO) Take 1 mg by mouth every other day       fish oil-omega-3 fatty acids 1000 MG capsule Take 2 g by mouth daily      Cholecalciferol (VITAMIN D PO) Take 1,000 Units by mouth daily       folic acid (FOLVITE) 1 MG tablet Take 1 mg by mouth daily.      NEW MED Tart cherry juice  1oz daily      PROCHLORPERAZINE MALEATE PO Take 10 mg by mouth as needed for nausea or vomiting                   Consultations:   No consultations were requested during this admission          Brief History of Illness:   From Admission H+P:   This patient is a 71 year old  male with a significant past medical history of malignancy( lung cancer) who presents with the following condition requiring a hospital admission:    Patient is a 71 yr old male with a past medical history that is significant for lung cancer diagnosed in 2012 . Patient follows with [place] [facility] . He is presently on chemotherapy for the lung cancer,recent visit was about a week ago.His symptoms started on Tuesday with profound weakness, nausea and vomiting. He also has some chills but no fevers. Patient reports no cough or shortness of breath. He had some diarrhea with his symptoms. Patient 's wife says he has also not been eating well.    In the ER his work up included labs and x-rays and he was found to have chest x-ray findings that were consistent with a right lower lung pneumonia.             TODAY:   Subjective:  Doing better  ROS:  Negative   /86 mmHg  Pulse 69  Temp(Src) 99.2  F (37.3  C) (Oral)  Resp 18  Ht 1.88 m (6' 2\")  Wt 98 kg (216 lb 0.8 oz)  BMI 27.73 kg/m2  SpO2 97%   Constitutional:    awake, alert, cooperative, no apparent distress, and appears stated age      Lungs:    no increased work of breathing, good air exchange, no retractions and crackles right base and right middle " lobe      Cardiovascular:    Normal apical impulse, regular rate and rhythm, normal S1 and S2, no S3 or S4, and no murmur noted      Abdomen:    No scars, normal bowel sounds, soft, non-distended, non-tender, no masses palpated, no hepatosplenomegally      Musculoskeletal:    no lower extremity pitting edema present      Neuropsychiatric:    General: normal, calm and normal eye contact  Level of consciousness: alert / normal  Affect: normal      Skin:    no bruising or bleeding                     Hospital Course:      Patient was admitted for pneumonia and was started on IV antibiotics , he also had severe nausea and vomiting. The nausea was much improved at the time of discharge. Appetite has not been too good. Started patient on Remeron for this.Patient is asked to follow up with oncology and his primary care doctor in the next few days.         Discharge Instructions and Follow-Up:   Discharge diet: Advance to a regular diet as tolerated   Discharge activity: Activity as tolerated   Discharge follow-up: Follow up with primary care provider in 3-5 days           Discharge Disposition:   Discharged to home      Attestation:  I have reviewed today's vital signs, notes, medications, labs and imaging.  Amount of time performed on this discharge summary: 20 minutes.    Meghann Esqueda MD

## 2017-01-19 LAB
BACTERIA SPEC CULT: NORMAL
BACTERIA SPEC CULT: NORMAL
Lab: NORMAL
Lab: NORMAL
MICRO REPORT STATUS: NORMAL
MICRO REPORT STATUS: NORMAL
SPECIMEN SOURCE: NORMAL
SPECIMEN SOURCE: NORMAL

## 2017-01-20 ENCOUNTER — OFFICE VISIT (OUTPATIENT)
Dept: FAMILY MEDICINE | Facility: CLINIC | Age: 72
End: 2017-01-20
Payer: COMMERCIAL

## 2017-01-20 VITALS
TEMPERATURE: 98.7 F | SYSTOLIC BLOOD PRESSURE: 109 MMHG | BODY MASS INDEX: 27.85 KG/M2 | OXYGEN SATURATION: 95 % | DIASTOLIC BLOOD PRESSURE: 78 MMHG | RESPIRATION RATE: 18 BRPM | HEART RATE: 103 BPM | WEIGHT: 217 LBS

## 2017-01-20 DIAGNOSIS — J18.9 PNEUMONIA OF RIGHT LOWER LOBE DUE TO INFECTIOUS ORGANISM: ICD-10-CM

## 2017-01-20 DIAGNOSIS — Z12.11 COLON CANCER SCREENING: Primary | ICD-10-CM

## 2017-01-20 PROCEDURE — 99213 OFFICE O/P EST LOW 20 MIN: CPT | Mod: 25 | Performed by: FAMILY MEDICINE

## 2017-01-20 PROCEDURE — 90670 PCV13 VACCINE IM: CPT | Performed by: FAMILY MEDICINE

## 2017-01-20 PROCEDURE — G0009 ADMIN PNEUMOCOCCAL VACCINE: HCPCS | Performed by: FAMILY MEDICINE

## 2017-01-20 NOTE — PROGRESS NOTES
SUBJECTIVE:                                                    Juan Choudhary is a 71 year old male who presents to clinic today for the following health issues:          Hospital Follow-up Visit:    Hospital/Nursing Home/IP Rehab Facility: [facility], [place]  Date of Admission: 1/13/17  Date of Discharge: 1/15/17  Reason(s) for Admission: Pneumonia of right lower lobe due to infectious organism             Problems taking medications regularly:  None       Medication changes since discharge: None       Problems adhering to non-medication therapy:  None    Summary of hospitalization:  [facility]  Diagnostic Tests/Treatments reviewed.  Follow up needed: none  Other Healthcare Providers Involved in Patient s Care:         None  Update since discharge: improved.     Post Discharge Medication Reconciliation: discharge medications reconciled, continue medications without change.  Plan of care communicated with patient     Coding guidelines for this visit:  Type of Medical   Decision Making Face-to-Face Visit       within 7 Days of discharge Face-to-Face Visit        within 14 days of discharge   Moderate Complexity [id number]   High Complexity 38412 [id number]                Problem list and histories reviewed & adjusted, as indicated.  Additional history:     OBJECTIVE:  LUNGS: clear to auscultation, normal breath sounds  CV: RRR without murmur  ABD: BS+, soft, nontender, no masses, no hepatosplenomegaly  EXTREMITIES: without joint tenderness, swelling or erythema.  No muscle tenderness or abnormality.  SKIN: No rashes or abnormalities  NEURO:non focal exam    ASSESSMENT:     Colon cancer screening  Pneumonia of right lower lobe due to infectious organism    PLAN:  Orders Placed This Encounter     GASTROENTEROLOGY ADULT REF PROCEDURE ONLY     He is getting get the second pneumococcal vaccine today  Follow up with primary physician for routine healthcare maintenance issues

## 2017-01-20 NOTE — NURSING NOTE
"Initial /78 mmHg  Pulse 103  Temp(Src) 98.7  F (37.1  C)  Resp 18  Wt 217 lb (98.431 kg)  SpO2 95% Estimated body mass index is 27.85 kg/(m^2) as calculated from the following:    Height as of 1/13/17: 6' 2\" (1.88 m).    Weight as of this encounter: 217 lb (98.431 kg). .    Chief Complaint   Patient presents with     Hospital F/U     Dana Yoder CMA    "

## 2017-01-20 NOTE — PATIENT INSTRUCTIONS
Thank you for choosing [facility].  You may be receiving a survey in the mail from [facility] regarding your visit today.  Please take a few minutes to complete and return the survey to let us know how we are doing.      Our Clinic hours are:  Mondays    7:20 am - 7 pm  Tues -  Fri  7:20 am - 5 pm    Clinic Phone: [phone]    The clinic lab opens at 7:30 am Mon - Fri and appointments are required.    [facility]. [phone]  Monday-Thursday 8 am - 7pm  Tues/Wed/Fri 8 am - 5:30 pm

## 2017-01-23 ENCOUNTER — TRANSFERRED RECORDS (OUTPATIENT)
Dept: HEALTH INFORMATION MANAGEMENT | Facility: CLINIC | Age: 72
End: 2017-01-23

## 2017-02-07 ENCOUNTER — TRANSFERRED RECORDS (OUTPATIENT)
Dept: HEALTH INFORMATION MANAGEMENT | Facility: CLINIC | Age: 72
End: 2017-02-07

## 2017-02-13 ENCOUNTER — TRANSFERRED RECORDS (OUTPATIENT)
Dept: HEALTH INFORMATION MANAGEMENT | Facility: CLINIC | Age: 72
End: 2017-02-13

## 2017-02-27 ENCOUNTER — TRANSFERRED RECORDS (OUTPATIENT)
Dept: HEALTH INFORMATION MANAGEMENT | Facility: CLINIC | Age: 72
End: 2017-02-27

## 2017-03-15 ENCOUNTER — TRANSFERRED RECORDS (OUTPATIENT)
Dept: HEALTH INFORMATION MANAGEMENT | Facility: CLINIC | Age: 72
End: 2017-03-15

## 2017-04-03 ENCOUNTER — TRANSFERRED RECORDS (OUTPATIENT)
Dept: HEALTH INFORMATION MANAGEMENT | Facility: CLINIC | Age: 72
End: 2017-04-03

## 2017-04-19 ENCOUNTER — TRANSFERRED RECORDS (OUTPATIENT)
Dept: HEALTH INFORMATION MANAGEMENT | Facility: CLINIC | Age: 72
End: 2017-04-19

## 2017-04-24 ENCOUNTER — TRANSFERRED RECORDS (OUTPATIENT)
Dept: HEALTH INFORMATION MANAGEMENT | Facility: CLINIC | Age: 72
End: 2017-04-24

## 2017-05-15 ENCOUNTER — TRANSFERRED RECORDS (OUTPATIENT)
Dept: HEALTH INFORMATION MANAGEMENT | Facility: CLINIC | Age: 72
End: 2017-05-15

## 2017-05-24 ENCOUNTER — TRANSFERRED RECORDS (OUTPATIENT)
Dept: HEALTH INFORMATION MANAGEMENT | Facility: CLINIC | Age: 72
End: 2017-05-24

## 2017-05-31 ENCOUNTER — TRANSFERRED RECORDS (OUTPATIENT)
Dept: HEALTH INFORMATION MANAGEMENT | Facility: CLINIC | Age: 72
End: 2017-05-31

## 2017-06-01 ENCOUNTER — OFFICE VISIT (OUTPATIENT)
Dept: FAMILY MEDICINE | Facility: CLINIC | Age: 72
End: 2017-06-01
Payer: COMMERCIAL

## 2017-06-01 ENCOUNTER — RADIANT APPOINTMENT (OUTPATIENT)
Dept: GENERAL RADIOLOGY | Facility: CLINIC | Age: 72
End: 2017-06-01
Attending: FAMILY MEDICINE
Payer: COMMERCIAL

## 2017-06-01 VITALS
SYSTOLIC BLOOD PRESSURE: 93 MMHG | TEMPERATURE: 96.7 F | DIASTOLIC BLOOD PRESSURE: 61 MMHG | BODY MASS INDEX: 25.04 KG/M2 | HEART RATE: 78 BPM | WEIGHT: 195 LBS

## 2017-06-01 DIAGNOSIS — R07.89 CHEST WALL PAIN: ICD-10-CM

## 2017-06-01 DIAGNOSIS — C34.91 ADENOCARCINOMA OF LUNG, RIGHT (H): ICD-10-CM

## 2017-06-01 DIAGNOSIS — R22.2 MASS OF CHEST WALL: ICD-10-CM

## 2017-06-01 DIAGNOSIS — M75.41 SHOULDER IMPINGEMENT SYNDROME, RIGHT: Primary | ICD-10-CM

## 2017-06-01 PROCEDURE — 71101 X-RAY EXAM UNILAT RIBS/CHEST: CPT | Mod: RT

## 2017-06-01 PROCEDURE — 99214 OFFICE O/P EST MOD 30 MIN: CPT | Performed by: FAMILY MEDICINE

## 2017-06-01 RX ORDER — MULTIVIT WITH MINERALS/LUTEIN
1 TABLET ORAL
COMMUNITY

## 2017-06-01 NOTE — MR AVS SNAPSHOT
"              After Visit Summary   6/1/2017    Juan Choudhary    MRN: [id number]           Patient Information     Date Of Birth          1945        Visit Information        Provider Department      6/1/2017 8:00 AM GIRISH Soto MD [facility]        Today's Diagnoses     Shoulder impingement syndrome, right    -  1    Chest wall pain        Adenocarcinoma of lung, right (H)          Care Instructions    Take Aleve consistently for 2 weeks, avoid over shoulder level use of both arms. If not improving you could come back for a steroid injection.          Follow-ups after your visit        Who to contact     If you have questions or need follow up information about today's clinic visit or your schedule please contact [facility] directly at [phone].  Normal or non-critical lab and imaging results will be communicated to you by Reviewspotterhart, letter or phone within 4 business days after the clinic has received the results. If you do not hear from us within 7 days, please contact the clinic through Reviewspotterhart or phone. If you have a critical or abnormal lab result, we will notify you by phone as soon as possible.  Submit refill requests through PocketMobile or call your pharmacy and they will forward the refill request to us. Please allow 3 business days for your refill to be completed.          Additional Information About Your Visit        [employer] Information     [employer]ocketMobile lets you send messages to your doctor, view your test results, renew your prescriptions, schedule appointments and more. To sign up, go to [email or web address] . Click on \"Log in\" on the left side of the screen, which will take you to the Welcome page. Then click on \"Sign up Now\" on the right side of the page.     You will be asked to enter the access code listed below, as well as some personal information. Please follow the directions to create your username and password.     Your access code is: " [email or web address]  Expires: 2017  8:47 AM     Your access code will  in 90 days. If you need help or a new code, please call your [place] clinic or [phone].        Care EveryWhere ID     This is your Care EveryWhere ID. This could be used by other organizations to access your [place] medical records  [id number]        Your Vitals Were     Pulse Temperature BMI (Body Mass Index)             78 96.7  F (35.9  C) (Tympanic) 25.04 kg/m2          Blood Pressure from Last 3 Encounters:   17 93/61   17 109/78   01/15/17 142/86    Weight from Last 3 Encounters:   17 195 lb (88.5 kg)   17 217 lb (98.4 kg)   01/15/17 216 lb 0.8 oz (98 kg)               Primary Care Provider Office Phone # Fax #    R Min Soto [phone]       [place] [address]        Thank you!     Thank you for choosing [facility]  for your care. Our goal is always to provide you with excellent care. Hearing back from our patients is one way we can continue to improve our services. Please take a few minutes to complete the written survey that you may receive in the mail after your visit with us. Thank you!             Your Updated Medication List - Protect others around you: Learn how to safely use, store and throw away your medicines at www.disposemymeds.org.          This list is accurate as of: 17  8:47 AM.  Always use your most recent med list.                   Brand Name Dispense Instructions for use    ASPIRIN EC PO      Take 81 mg by mouth daily       B-12 1000 MCG Caps      Take 1,000 mcg by mouth daily       CENTRUM SILVER per tablet      Take 1 tablet by mouth daily       DECADRON PO      Take 1 mg by mouth every other day       fish oil-omega-3 fatty acids 1000 MG capsule      Take 2 g by mouth daily       folic acid 1 MG tablet    FOLVITE     Take 1 mg by mouth daily.       NEW MED      Tart cherry juice  1oz daily        VITAMIN D PO      Take 1,000 Units by mouth daily

## 2017-06-01 NOTE — NURSING NOTE
"Chief Complaint   Patient presents with     Shoulder Pain     Patient states shoulder pain started one month ago. Pain is bilateral.      Mass     Lump under the skin, located on the right side of the sternum.        Initial BP 93/61 (BP Location: Right arm, Patient Position: Chair, Cuff Size: Adult Regular)  Pulse 78  Temp 96.7  F (35.9  C) (Tympanic)  Wt 195 lb (88.5 kg)  BMI 25.04 kg/m2 Estimated body mass index is 25.04 kg/(m^2) as calculated from the following:    Height as of 1/13/17: 6' 2\" (1.88 m).    Weight as of this encounter: 195 lb (88.5 kg).  Medication Reconciliation: complete   Faviola Dorantes, FELISHA        "

## 2017-06-01 NOTE — PROGRESS NOTES
"  SUBJECTIVE:                                                    Juan Choudhary is a 72 year old male who presents to clinic today for the following health issues:      Musculoskeletal problem/pain      Duration: 1 month    Description  Location: Shoulder, bilateral. His whole body feels achey    Intensity:  moderate    Accompanying signs and symptoms: \"Aches and Pains\"    History  Previous similar problem: no   Previous evaluation:  none    Precipitating or alleviating factors:  Trauma or overuse: no   Aggravating factors include: Once he starts moving for the day he starts noticing the pains    Therapies tried and outcome: rest/inactivity and Aleve     - Mass under the skin. Located on the right side of the sternum. This mass was noticed two weeks ago. Does seem to be a bit tender around the area.         Problem list and histories reviewed & adjusted, as indicated.  Additional history: The pain is in both shoulders but worse on the right. He was doing some work where he had to stack wood above shoulder level and that seemed to aggravate the pain. He generally sleeps lying on his side but now it this is uncomfortable because of the shoulder pain, and yet he has never been able to sleep well on his back.        Reviewed and updated as needed this visit by clinical staff       Reviewed and updated as needed this visit by Provider               ROS:  Constitutional, HEENT, cardiovascular, pulmonary, gi and gu systems are negative, except as otherwise noted.        OBJECTIVE:                                                    BP 93/61 (BP Location: Right arm, Patient Position: Chair, Cuff Size: Adult Regular)  Pulse 78  Temp 96.7  F (35.9  C) (Tympanic)  Wt 195 lb (88.5 kg)  BMI 25.04 kg/m2    GENERAL: healthy, alert and no distress  EYES: Eyes grossly normal to inspection, extraocular movements - intact, and PERRL  RESP: lungs clear to auscultation - no rales, no rhonchi, no wheezes  CV: regular rates and rhythm, " normal S1 S2, no S3 or S4 and no murmur, no click or rub -  MS: Bilateral shoulder exam: Normal appearance and no localized tenderness; normal internal and external rotation; pain with extreme flexion; pain with abduction beginning at 90   Chest wall: Visible and palpable firm mass right upper chest where the rib attaches to the sternum about 4 cm in diameter; slightly tender  SKIN: No skin changes overlying this mass in the right upper chest    Diagnostic test results:  Xray - no bony abnormality noted in the area where he has this mass on his chest wall      ASSESSMENT/PLAN:                                                    ASSESSMENT:  1. Shoulder impingement syndrome, right    2. Mass of chest wall    3. Chest wall pain    4. Adenocarcinoma of lung, right (H)      Discussed pathophysiology of this impingement syndrome and implications.  Questions answered. Discussed treatment options including nonsteroidal inflammatory drugs, avoiding using arms above shoulder level, and intra-articular steroid injection.    It is unclear what the cause of this chest wall mass is. He has been treated for over 5 years for an adenocarcinoma of the right lung, and yet it has never extended outside of the lung tissue.    PLAN:  Orders Placed This Encounter     XR Ribs & Chest Right G/E 3 Views     Multiple Vitamins-Minerals (CENTRUM SILVER) per tablet     We'll just have to wait and see what happens with this mass on his chest wall. He is going to be starting radiation for progression of one of his lung cancer areas in the right chest. He is going to try avoiding using his arm above shoulder level and anti-inflammatories for the shoulders. If not improving could come back for steroid injection.      GIRISH Copeland Post  [facility]

## 2017-06-01 NOTE — PATIENT INSTRUCTIONS
Take Aleve consistently for 2 weeks, avoid over shoulder level use of both arms. If not improving you could come back for a steroid injection.

## 2017-06-02 ENCOUNTER — RECORDS - HEALTHEAST (OUTPATIENT)
Dept: ADMINISTRATIVE | Facility: OTHER | Age: 72
End: 2017-06-02

## 2017-06-06 ENCOUNTER — HOSPITAL ENCOUNTER (OUTPATIENT)
Dept: RESPIRATORY THERAPY | Facility: HOSPITAL | Age: 72
Discharge: HOME | End: 2017-06-06
Attending: RADIOLOGY

## 2017-06-06 DIAGNOSIS — C34.90 LUNG CANCER (H): ICD-10-CM

## 2017-06-13 ENCOUNTER — TRANSFERRED RECORDS (OUTPATIENT)
Dept: HEALTH INFORMATION MANAGEMENT | Facility: CLINIC | Age: 72
End: 2017-06-13

## 2017-06-19 ENCOUNTER — TRANSFERRED RECORDS (OUTPATIENT)
Dept: HEALTH INFORMATION MANAGEMENT | Facility: CLINIC | Age: 72
End: 2017-06-19

## 2017-07-08 ENCOUNTER — HEALTH MAINTENANCE LETTER (OUTPATIENT)
Age: 72
End: 2017-07-08

## 2017-07-12 ENCOUNTER — TRANSFERRED RECORDS (OUTPATIENT)
Dept: HEALTH INFORMATION MANAGEMENT | Facility: CLINIC | Age: 72
End: 2017-07-12

## 2017-08-14 ENCOUNTER — TRANSFERRED RECORDS (OUTPATIENT)
Dept: HEALTH INFORMATION MANAGEMENT | Facility: CLINIC | Age: 72
End: 2017-08-14

## 2017-09-08 ENCOUNTER — OFFICE VISIT (OUTPATIENT)
Dept: FAMILY MEDICINE | Facility: CLINIC | Age: 72
End: 2017-09-08
Payer: COMMERCIAL

## 2017-09-08 VITALS
TEMPERATURE: 96.4 F | SYSTOLIC BLOOD PRESSURE: 90 MMHG | DIASTOLIC BLOOD PRESSURE: 60 MMHG | HEART RATE: 100 BPM | WEIGHT: 191.2 LBS | BODY MASS INDEX: 24.55 KG/M2

## 2017-09-08 DIAGNOSIS — J02.9 VIRAL PHARYNGITIS: Primary | ICD-10-CM

## 2017-09-08 DIAGNOSIS — C34.91 ADENOCARCINOMA OF LUNG, RIGHT (H): ICD-10-CM

## 2017-09-08 DIAGNOSIS — R07.0 THROAT PAIN: ICD-10-CM

## 2017-09-08 LAB
DEPRECATED S PYO AG THROAT QL EIA: NORMAL
SPECIMEN SOURCE: NORMAL

## 2017-09-08 PROCEDURE — 87081 CULTURE SCREEN ONLY: CPT | Performed by: FAMILY MEDICINE

## 2017-09-08 PROCEDURE — 99214 OFFICE O/P EST MOD 30 MIN: CPT | Performed by: FAMILY MEDICINE

## 2017-09-08 PROCEDURE — 87880 STREP A ASSAY W/OPTIC: CPT | Performed by: FAMILY MEDICINE

## 2017-09-08 NOTE — MR AVS SNAPSHOT
After Visit Summary   9/8/2017    Juan Choudhary    MRN: [id number]           Patient Information     Date Of Birth          1945        Visit Information        Provider Department      9/8/2017 8:40 AM GIRISH Soto MD [facility]        Today's Diagnoses     Viral pharyngitis    -  1    Throat pain        Adenocarcinoma of lung, right (H)          Care Instructions          Thank you for choosing [facility].  You may be receiving a survey in the mail from [facility] regarding your visit today.  Please take a few minutes to complete and return the survey to let us know how we are doing.      Our Clinic hours are:  Mondays    7:20 am - 7 pm  Tues -  Fri  7:20 am - 5 pm    Clinic Phone: [phone]    The clinic lab opens at 7:30 am Mon - Fri and appointments are required.    [facility]  Ph. [phone]  Monday-Thursday 8 am - 7pm  Tues/Wed/Fri 8 am - 5:30 pm                 Follow-ups after your visit        Who to contact     If you have questions or need follow up information about today's clinic visit or your schedule please contact [facility]note 2[facility] directly at [phone].  Normal or non-critical lab and imaging results will be communicated to you by MyChart, letter or phone within 4 business days after the clinic has received the results. If you do not hear from us within 7 days, please contact the clinic through MyChart or phone. If you have a critical or abnormal lab result, we will notify you by phone as soon as possible.  Submit refill requests through [employer] or call your pharmacy and they will forward the refill request to us. Please allow 3 business days for your refill to be completed.          Additional Information About Your Visit        [employer] Information     [employer] lets you send messages to your doctor, view your test results, renew your prescriptions, schedule appointments and more. To sign up, go to  "[email or web address] . Click on \"Log in\" on the left side of the screen, which will take you to the Welcome page. Then click on \"Sign up Now\" on the right side of the page.     You will be asked to enter the access code listed below, as well as some personal information. Please follow the directions to create your username and password.     Your access code is: [email or web address]  Expires: 2017  9:36 AM     Your access code will  in 90 days. If you need help or a new code, please call your [place] clinic or [phone].        Care EveryWhere ID     This is your Care EveryWhere ID. This could be used by other organizations to access your [place] medical records  [id number]        Your Vitals Were     Pulse Temperature BMI (Body Mass Index)             100 96.4  F (35.8  C) (Tympanic) 24.55 kg/m2          Blood Pressure from Last 3 Encounters:   17 90/60   17 93/61   17 109/78    Weight from Last 3 Encounters:   17 191 lb 3.2 oz (86.7 kg)   17 195 lb (88.5 kg)   17 217 lb (98.4 kg)              We Performed the Following     Beta strep group A culture     Rapid strep screen        Primary Care Provider Office Phone # Fax #    R Min Soto [phone] [address] [facility] [address]        Equal Access to Services     ANTONIO MCKENNA AH: Hadii aad ku hadasho Soomaali, waaxda luqadaha, qaybta kaalmada adeegyada, mony estrada . So [facility] [phone].    ATENCIÓN: Si habla español, tiene a lopez disposición servicios gratuitos de asistencia lingüística. Llame al [phone].    We comply with applicable federal civil rights laws and [place] laws. We do not discriminate on the basis of race, color, national origin, age, disability sex, sexual orientation or gender identity.            Thank you!     Thank you for choosing [facility]  for your care. Our goal is always to provide you with excellent " care. Hearing back from our patients is one way we can continue to improve our services. Please take a few minutes to complete the written survey that you may receive in the mail after your visit with us. Thank you!             Your Updated Medication List - Protect others around you: Learn how to safely use, store and throw away your medicines at www.disposemymeds.org.          This list is accurate as of: 9/8/17  9:36 AM.  Always use your most recent med list.                   Brand Name Dispense Instructions for use Diagnosis    ASPIRIN EC PO      Take 81 mg by mouth daily        B-12 1000 MCG Caps      Take 1,000 mcg by mouth daily        CENTRUM SILVER per tablet      Take 1 tablet by mouth daily        DECADRON PO      Take 1 mg by mouth every other day        fish oil-omega-3 fatty acids 1000 MG capsule      Take 2 g by mouth daily        folic acid 1 MG tablet    FOLVITE     Take 1 mg by mouth daily.        NEW MED      Tart cherry juice  1oz daily        VITAMIN D PO      Take 1,000 Units by mouth daily

## 2017-09-08 NOTE — NURSING NOTE
"Chief Complaint   Patient presents with     Pharyngitis     x 6 days       Initial BP 90/60 (Cuff Size: Adult Regular)  Pulse 100  Temp 96.4  F (35.8  C) (Tympanic)  Wt 191 lb 3.2 oz (86.7 kg)  BMI 24.55 kg/m2 Estimated body mass index is 24.55 kg/(m^2) as calculated from the following:    Height as of 1/13/17: 6' 2\" (1.88 m).    Weight as of this encounter: 191 lb 3.2 oz (86.7 kg).  Medication Reconciliation: complete   Maria Esther Card CMA      "

## 2017-09-08 NOTE — PATIENT INSTRUCTIONS
Thank you for choosing [facility].  You may be receiving a survey in the mail from [facility] regarding your visit today.  Please take a few minutes to complete and return the survey to let us know how we are doing.      Our Clinic hours are:  Mondays    7:20 am - 7 pm  Tues -  Fri  7:20 am - 5 pm    Clinic Phone: [phone]    The clinic lab opens at 7:30 am Mon - Fri and appointments are required.    [facility]. [phone]  Monday-Thursday 8 am - 7pm  Tues/Wed/Fri 8 am - 5:30 pm

## 2017-09-08 NOTE — LETTER
September 11, 2017      Juan Choudhary  [address] [address] [place] [phone]          The results of your 24 hour throat culture were negative. If you have any further questions please contact your clinic.      Sincerely,        GIRISH Soto MD

## 2017-09-08 NOTE — PROGRESS NOTES
SUBJECTIVE:   Juan Choudhary is a 72 year old male who presents to clinic today for the following health issues:      Chief Complaint   Patient presents with     Pharyngitis     x 6 days     Feels dry and scratchy; he just wanted to make sure that it was not strep. He denies cough or ear pain. A few days ago his ear felt plugged but that has resolved. Is not aware of any exposure to strep.    He has not been given any new or different medications for his lung cancer, had some focal radiation in the right upper lung area completed about 3 months ago        Problem list and histories reviewed & adjusted, as indicated.  Additional history: none        Reviewed and updated as needed this visit by clinical staff  Tobacco  Allergies  Meds  Med Hx  Surg Hx  Fam Hx  Soc Hx      Reviewed and updated as needed this visit by Provider               ROS:  Constitutional, HEENT, cardiovascular, pulmonary, gi and gu systems are negative, except as otherwise noted.          OBJECTIVE:                                                    BP 90/60 (Cuff Size: Adult Regular)  Pulse 100  Temp 96.4  F (35.8  C) (Tympanic)  Wt 191 lb 3.2 oz (86.7 kg)  BMI 24.55 kg/m2    GENERAL: pale and thin hair due to his chemotherapy  EYES: Eyes grossly normal to inspection, extraocular movements - intact, and PERRL  HENT: ear canals and TM's normal, nose and mouth without ulcers or lesions, tonsillar erythema, mild  NECK: no tenderness, no adenopathy, no asymmetry, no masses, no stiffness; thyroid- normal to palpation  RESP: lungs clear to auscultation - no rales, no rhonchi, no wheezes  CV: regular rates and rhythm, normal S1 S2, no S3 or S4 and no murmur, no click or rub -  MS: extremities- no gross deformities noted, no edema    Diagnostic test results:  Strep screen - Negative       ASSESSMENT/PLAN:                                                    ASSESSMENT:  1. Viral pharyngitis    2. Throat pain    3. Adenocarcinoma of lung, right  (H)        PLAN:  Symptomatic treatment with salt water gargles lozenges and analgesics. We will contact you with the final culture report      Patient Instructions         Thank you for choosing [facility].  You may be receiving a survey in the mail from Chel Mcdowell regarding your visit today.  Please take a few minutes to complete and return the survey to let us know how we are doing.      Our Clinic hours are:  Mondays    7:20 am - 7 pm  Tues -  Fri  7:20 am - 5 pm    Clinic Phone: [phone]    The clinic lab opens at 7:30 am Mon - Fri and appointments are required.    [facility] [place]  Ph. [phone]  Monday-Thursday 8 am - 7pm  Tues/Wed/Fri 8 am - 5:30 pm             GIRISH Soto  [facility]

## 2017-09-09 LAB
BACTERIA SPEC CULT: NORMAL
SPECIMEN SOURCE: NORMAL

## 2017-09-11 ENCOUNTER — TRANSFERRED RECORDS (OUTPATIENT)
Dept: HEALTH INFORMATION MANAGEMENT | Facility: CLINIC | Age: 72
End: 2017-09-11

## 2017-09-13 ENCOUNTER — TRANSFERRED RECORDS (OUTPATIENT)
Dept: HEALTH INFORMATION MANAGEMENT | Facility: CLINIC | Age: 72
End: 2017-09-13

## 2017-09-18 ENCOUNTER — OFFICE VISIT (OUTPATIENT)
Dept: FAMILY MEDICINE | Facility: CLINIC | Age: 72
End: 2017-09-18
Payer: COMMERCIAL

## 2017-09-18 VITALS
BODY MASS INDEX: 24.78 KG/M2 | TEMPERATURE: 97.6 F | WEIGHT: 193 LBS | OXYGEN SATURATION: 95 % | SYSTOLIC BLOOD PRESSURE: 105 MMHG | HEART RATE: 80 BPM | DIASTOLIC BLOOD PRESSURE: 69 MMHG

## 2017-09-18 DIAGNOSIS — L60.0 INGROWING LEFT GREAT TOENAIL: Primary | ICD-10-CM

## 2017-09-18 DIAGNOSIS — B35.1 ONYCHOMYCOSIS: ICD-10-CM

## 2017-09-18 PROCEDURE — 99213 OFFICE O/P EST LOW 20 MIN: CPT | Performed by: FAMILY MEDICINE

## 2017-09-18 ASSESSMENT — PAIN SCALES - GENERAL: PAINLEVEL: MILD PAIN (2)

## 2017-09-18 NOTE — MR AVS SNAPSHOT
"              After Visit Summary   9/18/2017    Juan Choudhary    MRN: [id number]           Patient Information     Date Of Birth          1945        Visit Information        Provider Department      9/18/2017 2:20 PM Brittany, GIRISH Copeland MD [facility]        Today's Diagnoses     Ingrowing left great toenail    -  1      Care Instructions    You may remove the bulky dressing this evening, then start soaking the toe twice a day in warm water. After soaking apply antibiotic ointment and a simple Band-Aid. To prevent recurrence in the future put a pledget of cotton under the corner of the nail and let the nail grow as long as possible so that the cotton lifts upon the corner of the nail              Follow-ups after your visit        Who to contact     If you have questions or need follow up information about today's clinic visit or your schedule please contact [facility] directly at [phone].  Normal or non-critical lab and imaging results will be communicated to you by MyChart, letter or phone within 4 business days after the clinic has received the results. If you do not hear from us within 7 days, please contact the clinic through [employer] or phone. If you have a critical or abnormal lab result, we will notify you by phone as soon as possible.  Submit refill requests through [employer] or call your pharmacy and they will forward the refill request to us. Please allow 3 business days for your refill to be completed.          Additional Information About Your Visit        [employer] Information     [employer] lets you send messages to your doctor, view your test results, renew your prescriptions, schedule appointments and more. To sign up, go to [email or web address] . Click on \"Log in\" on the left side of the screen, which will take you to the Welcome page. Then click on \"Sign up Now\" on the right side of the page.     You will be asked to enter the access code listed below, as " well as some personal information. Please follow the directions to create your username and password.     Your access code is: [email or web address]  Expires: 2017  9:36 AM     Your access code will  in 90 days. If you need help or a new code, please call your [place] clinic or [phone].        Care EveryWhere ID     This is your Care EveryWhere ID. This could be used by other organizations to access your [place] medical records  [id number]        Your Vitals Were     Pulse Temperature Pulse Oximetry BMI (Body Mass Index)          80 97.6  F (36.4  C) (Tympanic) 95% 24.78 kg/m2         Blood Pressure from Last 3 Encounters:   17 105/69   17 90/60   17 93/61    Weight from Last 3 Encounters:   17 193 lb (87.5 kg)   17 191 lb 3.2 oz (86.7 kg)   17 195 lb (88.5 kg)              Today, you had the following     No orders found for display       Primary Care Provider Office Phone # Fax #    R Min Soto [phone] [address] [facility] [address]        Equal Access to Services     SATISH MCKENNA AH: Hadii aad ku hadasho Soomaali, waaxda luqadaha, qaybta kaalmada adeegyada, waxay еленаin hayloreen heraclio estrada ah. So [facility] [phone].    ATENCIÓN: Si habla español, tiene a lopez disposición servicios gratuitos de asistencia lingüística. Llame al [phone].    We comply with applicable federal civil rights laws and [place] laws. We do not discriminate on the basis of race, color, national origin, age, disability sex, sexual orientation or gender identity.            Thank you!     Thank you for choosing [facility]  for your care. Our goal is always to provide you with excellent care. Hearing back from our patients is one way we can continue to improve our services. Please take a few minutes to complete the written survey that you may receive in the mail after your visit with us. Thank you!             Your Updated  Medication List - Protect others around you: Learn how to safely use, store and throw away your medicines at www.disposemymeds.org.          This list is accurate as of: 9/18/17  3:04 PM.  Always use your most recent med list.                   Brand Name Dispense Instructions for use Diagnosis    ASPIRIN EC PO      Take 81 mg by mouth daily        B-12 1000 MCG Caps      Take 1,000 mcg by mouth daily        CENTRUM SILVER per tablet      Take 1 tablet by mouth daily        DECADRON PO      Take 1 mg by mouth every other day        fish oil-omega-3 fatty acids 1000 MG capsule      Take 2 g by mouth daily        folic acid 1 MG tablet    FOLVITE     Take 1 mg by mouth daily.        NEW MED      Tart cherry juice  1oz daily        VITAMIN D PO      Take 1,000 Units by mouth daily

## 2017-09-18 NOTE — PATIENT INSTRUCTIONS
You may remove the bulky dressing this evening, then start soaking the toe twice a day in warm water. After soaking apply antibiotic ointment and a simple Band-Aid. To prevent recurrence in the future put a pledget of cotton under the corner of the nail and let the nail grow as long as possible so that the cotton lifts upon the corner of the nail

## 2017-09-18 NOTE — NURSING NOTE
"Chief Complaint   Patient presents with     Toe Pain     Large toe on left foot is painful mostly on the end of toe been few months       Initial /69 (BP Location: Right arm, Patient Position: Chair, Cuff Size: Adult Regular)  Pulse 80  Temp 97.6  F (36.4  C) (Tympanic)  Wt 193 lb (87.5 kg)  SpO2 95%  BMI 24.78 kg/m2 Estimated body mass index is 24.78 kg/(m^2) as calculated from the following:    Height as of 1/13/17: 6' 2\" (1.88 m).    Weight as of this encounter: 193 lb (87.5 kg).  Medication Reconciliation: complete   Montserrat Ibrahim CMA       "

## 2017-09-18 NOTE — PROGRESS NOTES
SUBJECTIVE:   Juan Choudhary is a 72 year old male who presents to clinic today for the following health issues:      TOE PAIN-Large toe on left foot is painful mostly on the end of toe been few months    He has/has not had any surgical procedures on this toenail in the past.      Problem list and histories reviewed & adjusted, as indicated.  Additional history: none        Reviewed and updated as needed this visit by clinical staff  Tobacco  Allergies  Med Hx  Surg Hx  Fam Hx  Soc Hx      Reviewed and updated as needed this visit by Provider               Medications, Past Medical History, Surgical History reviewed.    Objective:  The affected side of the toenail shows an ingrown nail with associated erythema, swelling, tenderness.  The nail is thickened and discolored with debris under the nail consistent with onychomycosis    Assessment: 1)Painful ingrown toenail  2) onychomycosis    Plan:  Discussed the available options.  The patient requested removal of the involved side of the nail.  After a digital block was performed using 1% Lidocaine, the lateral 1/4th of the nail was removed by using the nail splitter, then grasping with a straight hemostat and pulling the segment of nail out using a rolling motion away from the involved side of the nail.  Hemostasis obtained  by applying simple pressure, then application of antibiotic ointment and covering with a gauze dressing.  The patient was instructed to soak the foot daily and apply antibiotic ointment covered with a simple Band-Aid for one week.  Recheck if any problems.  Discussed proper trimming of nails to prevent recurrence.

## 2017-11-06 ENCOUNTER — TRANSFERRED RECORDS (OUTPATIENT)
Dept: HEALTH INFORMATION MANAGEMENT | Facility: CLINIC | Age: 72
End: 2017-11-06

## 2017-11-16 ENCOUNTER — TELEPHONE (OUTPATIENT)
Dept: FAMILY MEDICINE | Facility: CLINIC | Age: 72
End: 2017-11-16

## 2017-12-06 ENCOUNTER — TRANSFERRED RECORDS (OUTPATIENT)
Dept: HEALTH INFORMATION MANAGEMENT | Facility: CLINIC | Age: 72
End: 2017-12-06

## 2017-12-11 ENCOUNTER — TRANSFERRED RECORDS (OUTPATIENT)
Dept: HEALTH INFORMATION MANAGEMENT | Facility: CLINIC | Age: 72
End: 2017-12-11

## 2017-12-14 ENCOUNTER — TRANSFERRED RECORDS (OUTPATIENT)
Dept: HEALTH INFORMATION MANAGEMENT | Facility: CLINIC | Age: 72
End: 2017-12-14

## 2018-01-01 ENCOUNTER — TRANSFERRED RECORDS (OUTPATIENT)
Dept: HEALTH INFORMATION MANAGEMENT | Facility: CLINIC | Age: 73
End: 2018-01-01

## 2018-01-01 ENCOUNTER — PATIENT OUTREACH (OUTPATIENT)
Dept: CARE COORDINATION | Facility: CLINIC | Age: 73
End: 2018-01-01

## 2018-01-01 ENCOUNTER — APPOINTMENT (OUTPATIENT)
Dept: CT IMAGING | Facility: CLINIC | Age: 73
DRG: 194 | End: 2018-01-01
Attending: EMERGENCY MEDICINE
Payer: MEDICARE

## 2018-01-01 ENCOUNTER — HOSPITAL ENCOUNTER (INPATIENT)
Facility: CLINIC | Age: 73
LOS: 2 days | Discharge: HOME | DRG: 194 | End: 2018-05-13
Attending: EMERGENCY MEDICINE | Admitting: FAMILY MEDICINE
Payer: MEDICARE

## 2018-01-01 ENCOUNTER — HOSPITAL ENCOUNTER (OUTPATIENT)
Dept: CT IMAGING | Facility: CLINIC | Age: 73
Discharge: HOME | End: 2018-10-03
Attending: INTERNAL MEDICINE | Admitting: INTERNAL MEDICINE
Payer: MEDICARE

## 2018-01-01 ENCOUNTER — OFFICE VISIT (OUTPATIENT)
Dept: FAMILY MEDICINE | Facility: CLINIC | Age: 73
End: 2018-01-01
Payer: COMMERCIAL

## 2018-01-01 ENCOUNTER — RADIANT APPOINTMENT (OUTPATIENT)
Dept: GENERAL RADIOLOGY | Facility: CLINIC | Age: 73
End: 2018-01-01
Attending: FAMILY MEDICINE
Payer: COMMERCIAL

## 2018-01-01 ENCOUNTER — PATIENT OUTREACH (OUTPATIENT)
Dept: FAMILY MEDICINE | Facility: CLINIC | Age: 73
End: 2018-01-01

## 2018-01-01 ENCOUNTER — HOSPITAL ENCOUNTER (OUTPATIENT)
Dept: RADIOLOGY | Facility: HOSPITAL | Age: 73
Discharge: HOME | End: 2018-12-27

## 2018-01-01 ENCOUNTER — HOSPITAL ENCOUNTER (EMERGENCY)
Facility: CLINIC | Age: 73
Discharge: HOME | End: 2018-12-29
Attending: FAMILY MEDICINE | Admitting: FAMILY MEDICINE
Payer: MEDICARE

## 2018-01-01 VITALS
HEART RATE: 102 BPM | BODY MASS INDEX: 22.52 KG/M2 | OXYGEN SATURATION: 95 % | TEMPERATURE: 97.1 F | DIASTOLIC BLOOD PRESSURE: 60 MMHG | WEIGHT: 175.5 LBS | RESPIRATION RATE: 20 BRPM | SYSTOLIC BLOOD PRESSURE: 84 MMHG | HEIGHT: 74 IN

## 2018-01-01 VITALS
DIASTOLIC BLOOD PRESSURE: 71 MMHG | OXYGEN SATURATION: 89 % | HEIGHT: 74 IN | WEIGHT: 175.8 LBS | SYSTOLIC BLOOD PRESSURE: 100 MMHG | TEMPERATURE: 98 F | BODY MASS INDEX: 22.56 KG/M2 | HEART RATE: 101 BPM

## 2018-01-01 VITALS
DIASTOLIC BLOOD PRESSURE: 75 MMHG | OXYGEN SATURATION: 96 % | HEART RATE: 81 BPM | TEMPERATURE: 97.7 F | BODY MASS INDEX: 22.08 KG/M2 | RESPIRATION RATE: 14 BRPM | SYSTOLIC BLOOD PRESSURE: 116 MMHG | WEIGHT: 172 LBS

## 2018-01-01 VITALS
SYSTOLIC BLOOD PRESSURE: 109 MMHG | RESPIRATION RATE: 16 BRPM | OXYGEN SATURATION: 97 % | WEIGHT: 171.96 LBS | DIASTOLIC BLOOD PRESSURE: 72 MMHG | HEART RATE: 76 BPM | BODY MASS INDEX: 22.08 KG/M2 | TEMPERATURE: 98.2 F

## 2018-01-01 VITALS
HEIGHT: 74 IN | TEMPERATURE: 100.2 F | OXYGEN SATURATION: 96 % | WEIGHT: 180.2 LBS | SYSTOLIC BLOOD PRESSURE: 65 MMHG | HEART RATE: 100 BPM | DIASTOLIC BLOOD PRESSURE: 46 MMHG | BODY MASS INDEX: 23.13 KG/M2

## 2018-01-01 DIAGNOSIS — J18.9 PNEUMONIA OF RIGHT LOWER LOBE DUE TO INFECTIOUS ORGANISM: ICD-10-CM

## 2018-01-01 DIAGNOSIS — C34.90 NON-SMALL CELL LUNG CANCER (NSCLC) (H): ICD-10-CM

## 2018-01-01 DIAGNOSIS — K52.1 ANTIBIOTIC-ASSOCIATED DIARRHEA: ICD-10-CM

## 2018-01-01 DIAGNOSIS — C34.31 MALIGNANT NEOPLASM OF LOWER LOBE OF RIGHT LUNG (H): ICD-10-CM

## 2018-01-01 DIAGNOSIS — R11.0 NAUSEA: ICD-10-CM

## 2018-01-01 DIAGNOSIS — R06.02 SOB (SHORTNESS OF BREATH): ICD-10-CM

## 2018-01-01 DIAGNOSIS — R05.9 COUGH: ICD-10-CM

## 2018-01-01 DIAGNOSIS — C34.91 ADENOCARCINOMA OF LUNG, RIGHT (H): ICD-10-CM

## 2018-01-01 DIAGNOSIS — J18.9 PNEUMONIA OF BOTH LUNGS DUE TO INFECTIOUS ORGANISM, UNSPECIFIED PART OF LUNG: ICD-10-CM

## 2018-01-01 DIAGNOSIS — J18.9 COMMUNITY ACQUIRED PNEUMONIA OF RIGHT LOWER LOBE OF LUNG: Primary | ICD-10-CM

## 2018-01-01 DIAGNOSIS — J18.9 COMMUNITY ACQUIRED PNEUMONIA, UNSPECIFIED LATERALITY: ICD-10-CM

## 2018-01-01 DIAGNOSIS — J18.9 COMMUNITY ACQUIRED PNEUMONIA, UNSPECIFIED LATERALITY: Primary | ICD-10-CM

## 2018-01-01 DIAGNOSIS — T36.95XA ANTIBIOTIC-ASSOCIATED DIARRHEA: ICD-10-CM

## 2018-01-01 DIAGNOSIS — R50.9 FEVER: ICD-10-CM

## 2018-01-01 DIAGNOSIS — C34.30 MALIGNANT NEOPLASM OF LOWER LOBE OF LUNG, UNSPECIFIED LATERALITY (H): ICD-10-CM

## 2018-01-01 DIAGNOSIS — R19.7 DIARRHEA, UNSPECIFIED TYPE: Primary | ICD-10-CM

## 2018-01-01 DIAGNOSIS — C34.90 NON-SMALL CELL LUNG CANCER (H): ICD-10-CM

## 2018-01-01 DIAGNOSIS — G47.00 INSOMNIA, UNSPECIFIED TYPE: ICD-10-CM

## 2018-01-01 DIAGNOSIS — J20.9 ACUTE BRONCHITIS WITH SYMPTOMS GREATER THAN 10 DAYS: Primary | ICD-10-CM

## 2018-01-01 LAB
ALBUMIN SERPL-MCNC: 2.6 G/DL (ref 3.4–5)
ALBUMIN SERPL-MCNC: 3.5 G/DL (ref 3.4–5)
ALP SERPL-CCNC: 49 U/L (ref 40–150)
ALP SERPL-CCNC: 61 U/L (ref 40–150)
ALT SERPL W P-5'-P-CCNC: 25 U/L (ref 0–70)
ALT SERPL W P-5'-P-CCNC: 28 U/L (ref 0–70)
ANION GAP SERPL CALCULATED.3IONS-SCNC: 5 MMOL/L (ref 3–14)
ANION GAP SERPL CALCULATED.3IONS-SCNC: 5 MMOL/L (ref 3–14)
ANION GAP SERPL CALCULATED.3IONS-SCNC: 6 MMOL/L (ref 3–14)
AST SERPL W P-5'-P-CCNC: 23 U/L (ref 0–45)
AST SERPL W P-5'-P-CCNC: 26 U/L (ref 0–45)
BACTERIA SPEC CULT: ABNORMAL
BACTERIA SPEC CULT: NO GROWTH
BACTERIA SPEC CULT: NO GROWTH
BACTERIA SPEC CULT: NORMAL
BASE EXCESS BLDV CALC-SCNC: 2.5 MMOL/L
BASOPHILS # BLD AUTO: 0 10E9/L (ref 0–0.2)
BASOPHILS # BLD AUTO: 0 10E9/L (ref 0–0.2)
BASOPHILS NFR BLD AUTO: 0.6 %
BASOPHILS NFR BLD AUTO: 0.6 %
BILIRUB SERPL-MCNC: 0.5 MG/DL (ref 0.2–1.3)
BILIRUB SERPL-MCNC: 0.7 MG/DL (ref 0.2–1.3)
BUN SERPL-MCNC: 13 MG/DL (ref 7–30)
BUN SERPL-MCNC: 17 MG/DL (ref 7–30)
BUN SERPL-MCNC: 9 MG/DL (ref 7–30)
CALCIUM SERPL-MCNC: 7.7 MG/DL (ref 8.5–10.1)
CALCIUM SERPL-MCNC: 7.9 MG/DL (ref 8.5–10.1)
CALCIUM SERPL-MCNC: 8.3 MG/DL (ref 8.5–10.1)
CHLORIDE SERPL-SCNC: 101 MMOL/L (ref 94–109)
CHLORIDE SERPL-SCNC: 104 MMOL/L (ref 94–109)
CHLORIDE SERPL-SCNC: 107 MMOL/L (ref 94–109)
CO2 SERPL-SCNC: 28 MMOL/L (ref 20–32)
CO2 SERPL-SCNC: 28 MMOL/L (ref 20–32)
CO2 SERPL-SCNC: 29 MMOL/L (ref 20–32)
CREAT BLD-MCNC: 1.1 MG/DL (ref 0.66–1.25)
CREAT SERPL-MCNC: 0.78 MG/DL (ref 0.66–1.25)
CREAT SERPL-MCNC: 0.82 MG/DL (ref 0.66–1.25)
CREAT SERPL-MCNC: 1.02 MG/DL (ref 0.66–1.25)
DIFFERENTIAL METHOD BLD: ABNORMAL
DIFFERENTIAL METHOD BLD: ABNORMAL
EOSINOPHIL # BLD AUTO: 0.1 10E9/L (ref 0–0.7)
EOSINOPHIL # BLD AUTO: 0.1 10E9/L (ref 0–0.7)
EOSINOPHIL NFR BLD AUTO: 1.6 %
EOSINOPHIL NFR BLD AUTO: 4.2 %
ERYTHROCYTE [DISTWIDTH] IN BLOOD BY AUTOMATED COUNT: 13.7 % (ref 10–15)
ERYTHROCYTE [DISTWIDTH] IN BLOOD BY AUTOMATED COUNT: 13.8 % (ref 10–15)
ERYTHROCYTE [DISTWIDTH] IN BLOOD BY AUTOMATED COUNT: 13.9 % (ref 10–15)
FLUAV H1 2009 PAND RNA SPEC QL NAA+PROBE: NEGATIVE
FLUAV H1 RNA SPEC QL NAA+PROBE: NEGATIVE
FLUAV H3 RNA SPEC QL NAA+PROBE: NEGATIVE
FLUAV RNA SPEC QL NAA+PROBE: NEGATIVE
FLUBV RNA SPEC QL NAA+PROBE: NEGATIVE
GFR SERPL CREATININE-BSD FRML MDRD: 65 ML/MIN/1.7M2
GFR SERPL CREATININE-BSD FRML MDRD: 71 ML/MIN/1.7M2
GFR SERPL CREATININE-BSD FRML MDRD: >90 ML/MIN/1.7M2
GFR SERPL CREATININE-BSD FRML MDRD: >90 ML/MIN/1.7M2
GLUCOSE SERPL-MCNC: 107 MG/DL (ref 70–99)
GLUCOSE SERPL-MCNC: 87 MG/DL (ref 70–99)
GLUCOSE SERPL-MCNC: 90 MG/DL (ref 70–99)
GRAM STN SPEC: NORMAL
HADV DNA SPEC QL NAA+PROBE: NEGATIVE
HADV DNA SPEC QL NAA+PROBE: NEGATIVE
HCO3 BLDV-SCNC: 28 MMOL/L (ref 21–28)
HCT VFR BLD AUTO: 30.9 % (ref 40–53)
HCT VFR BLD AUTO: 31.7 % (ref 40–53)
HCT VFR BLD AUTO: 37.7 % (ref 40–53)
HGB BLD-MCNC: 10.3 G/DL (ref 13.3–17.7)
HGB BLD-MCNC: 10.3 G/DL (ref 13.3–17.7)
HGB BLD-MCNC: 12.5 G/DL (ref 13.3–17.7)
HMPV RNA SPEC QL NAA+PROBE: NEGATIVE
HPIV1 RNA SPEC QL NAA+PROBE: NEGATIVE
HPIV2 RNA SPEC QL NAA+PROBE: NEGATIVE
HPIV3 RNA SPEC QL NAA+PROBE: POSITIVE
IMM GRANULOCYTES # BLD: 0 10E9/L (ref 0–0.4)
IMM GRANULOCYTES # BLD: 0 10E9/L (ref 0–0.4)
IMM GRANULOCYTES NFR BLD: 0.3 %
IMM GRANULOCYTES NFR BLD: 0.6 %
LACTATE BLD-SCNC: 0.7 MMOL/L (ref 0.4–1.9)
LACTATE BLD-SCNC: 0.9 MMOL/L (ref 0.7–2)
LYMPHOCYTES # BLD AUTO: 0.5 10E9/L (ref 0.8–5.3)
LYMPHOCYTES # BLD AUTO: 0.6 10E9/L (ref 0.8–5.3)
LYMPHOCYTES NFR BLD AUTO: 18.4 %
LYMPHOCYTES NFR BLD AUTO: 27.1 %
Lab: NORMAL
MCH RBC QN AUTO: 27.9 PG (ref 26.5–33)
MCH RBC QN AUTO: 28.3 PG (ref 26.5–33)
MCH RBC QN AUTO: 28.4 PG (ref 26.5–33)
MCHC RBC AUTO-ENTMCNC: 32.5 G/DL (ref 31.5–36.5)
MCHC RBC AUTO-ENTMCNC: 33.2 G/DL (ref 31.5–36.5)
MCHC RBC AUTO-ENTMCNC: 33.3 G/DL (ref 31.5–36.5)
MCV RBC AUTO: 85 FL (ref 78–100)
MCV RBC AUTO: 85 FL (ref 78–100)
MCV RBC AUTO: 86 FL (ref 78–100)
MICROBIOLOGIST REVIEW: ABNORMAL
MONOCYTES # BLD AUTO: 0.3 10E9/L (ref 0–1.3)
MONOCYTES # BLD AUTO: 0.4 10E9/L (ref 0–1.3)
MONOCYTES NFR BLD AUTO: 13.9 %
MONOCYTES NFR BLD AUTO: 19.3 %
NEUTROPHILS # BLD AUTO: 0.8 10E9/L (ref 1.6–8.3)
NEUTROPHILS # BLD AUTO: 2 10E9/L (ref 1.6–8.3)
NEUTROPHILS NFR BLD AUTO: 48.2 %
NEUTROPHILS NFR BLD AUTO: 65.2 %
PCO2 BLDV: 48 MM HG (ref 40–50)
PH BLDV: 7.38 PH (ref 7.32–7.43)
PLATELET # BLD AUTO: 114 10E9/L (ref 150–450)
PLATELET # BLD AUTO: 120 10E9/L (ref 150–450)
PLATELET # BLD AUTO: 150 10E9/L (ref 150–450)
PO2 BLDV: <10 MM HG (ref 25–47)
POTASSIUM SERPL-SCNC: 4.1 MMOL/L (ref 3.4–5.3)
POTASSIUM SERPL-SCNC: 4.1 MMOL/L (ref 3.4–5.3)
POTASSIUM SERPL-SCNC: 4.2 MMOL/L (ref 3.4–5.3)
PROCALCITONIN SERPL-MCNC: <0.05 NG/ML
PROT SERPL-MCNC: 5.8 G/DL (ref 6.8–8.8)
PROT SERPL-MCNC: 7.5 G/DL (ref 6.8–8.8)
RBC # BLD AUTO: 3.63 10E12/L (ref 4.4–5.9)
RBC # BLD AUTO: 3.69 10E12/L (ref 4.4–5.9)
RBC # BLD AUTO: 4.42 10E12/L (ref 4.4–5.9)
RHINOVIRUS RNA SPEC QL NAA+PROBE: NEGATIVE
RSV RNA SPEC QL NAA+PROBE: NEGATIVE
RSV RNA SPEC QL NAA+PROBE: NEGATIVE
SODIUM SERPL-SCNC: 135 MMOL/L (ref 133–144)
SODIUM SERPL-SCNC: 137 MMOL/L (ref 133–144)
SODIUM SERPL-SCNC: 141 MMOL/L (ref 133–144)
SPECIMEN SOURCE: ABNORMAL
SPECIMEN SOURCE: ABNORMAL
SPECIMEN SOURCE: NORMAL
WBC # BLD AUTO: 1.7 10E9/L (ref 4–11)
WBC # BLD AUTO: 1.7 10E9/L (ref 4–11)
WBC # BLD AUTO: 3.1 10E9/L (ref 4–11)

## 2018-01-01 PROCEDURE — 87205 SMEAR GRAM STAIN: CPT | Performed by: EMERGENCY MEDICINE

## 2018-01-01 PROCEDURE — 82803 BLOOD GASES ANY COMBINATION: CPT | Performed by: EMERGENCY MEDICINE

## 2018-01-01 PROCEDURE — 99232 SBSQ HOSP IP/OBS MODERATE 35: CPT | Performed by: FAMILY MEDICINE

## 2018-01-01 PROCEDURE — 94640 AIRWAY INHALATION TREATMENT: CPT

## 2018-01-01 PROCEDURE — 80053 COMPREHEN METABOLIC PANEL: CPT | Performed by: FAMILY MEDICINE

## 2018-01-01 PROCEDURE — 87040 BLOOD CULTURE FOR BACTERIA: CPT | Performed by: EMERGENCY MEDICINE

## 2018-01-01 PROCEDURE — 99284 EMERGENCY DEPT VISIT MOD MDM: CPT | Performed by: FAMILY MEDICINE

## 2018-01-01 PROCEDURE — 71260 CT THORAX DX C+: CPT

## 2018-01-01 PROCEDURE — 94640 AIRWAY INHALATION TREATMENT: CPT | Mod: 76

## 2018-01-01 PROCEDURE — 25000128 H RX IP 250 OP 636: Performed by: EMERGENCY MEDICINE

## 2018-01-01 PROCEDURE — 25000128 H RX IP 250 OP 636: Performed by: FAMILY MEDICINE

## 2018-01-01 PROCEDURE — 85027 COMPLETE CBC AUTOMATED: CPT | Performed by: FAMILY MEDICINE

## 2018-01-01 PROCEDURE — 80048 BASIC METABOLIC PNL TOTAL CA: CPT | Performed by: FAMILY MEDICINE

## 2018-01-01 PROCEDURE — 99284 EMERGENCY DEPT VISIT MOD MDM: CPT | Mod: 25 | Performed by: FAMILY MEDICINE

## 2018-01-01 PROCEDURE — 25000132 ZZH RX MED GY IP 250 OP 250 PS 637: Mod: GY | Performed by: FAMILY MEDICINE

## 2018-01-01 PROCEDURE — 83605 ASSAY OF LACTIC ACID: CPT | Performed by: FAMILY MEDICINE

## 2018-01-01 PROCEDURE — 84145 PROCALCITONIN (PCT): CPT | Performed by: FAMILY MEDICINE

## 2018-01-01 PROCEDURE — 87186 SC STD MICRODIL/AGAR DIL: CPT | Performed by: FAMILY MEDICINE

## 2018-01-01 PROCEDURE — 87633 RESP VIRUS 12-25 TARGETS: CPT | Performed by: FAMILY MEDICINE

## 2018-01-01 PROCEDURE — 99223 1ST HOSP IP/OBS HIGH 75: CPT | Mod: AI | Performed by: FAMILY MEDICINE

## 2018-01-01 PROCEDURE — 85025 COMPLETE CBC W/AUTO DIFF WBC: CPT | Performed by: EMERGENCY MEDICINE

## 2018-01-01 PROCEDURE — 12000011 ZZH R&B MS OVERFLOW

## 2018-01-01 PROCEDURE — A9270 NON-COVERED ITEM OR SERVICE: HCPCS | Mod: GY | Performed by: FAMILY MEDICINE

## 2018-01-01 PROCEDURE — 71046 X-RAY EXAM CHEST 2 VIEWS: CPT | Mod: FY

## 2018-01-01 PROCEDURE — 25000125 ZZHC RX 250: Performed by: EMERGENCY MEDICINE

## 2018-01-01 PROCEDURE — 36415 COLL VENOUS BLD VENIPUNCTURE: CPT | Performed by: FAMILY MEDICINE

## 2018-01-01 PROCEDURE — 25000125 ZZHC RX 250: Performed by: FAMILY MEDICINE

## 2018-01-01 PROCEDURE — 25000125 ZZHC RX 250: Performed by: RADIOLOGY

## 2018-01-01 PROCEDURE — 96365 THER/PROPH/DIAG IV INF INIT: CPT | Performed by: EMERGENCY MEDICINE

## 2018-01-01 PROCEDURE — 83605 ASSAY OF LACTIC ACID: CPT | Performed by: EMERGENCY MEDICINE

## 2018-01-01 PROCEDURE — 87070 CULTURE OTHR SPECIMN AEROBIC: CPT | Performed by: EMERGENCY MEDICINE

## 2018-01-01 PROCEDURE — 85025 COMPLETE CBC W/AUTO DIFF WBC: CPT | Performed by: FAMILY MEDICINE

## 2018-01-01 PROCEDURE — 25000132 ZZH RX MED GY IP 250 OP 250 PS 637: Mod: GY | Performed by: PHYSICIAN ASSISTANT

## 2018-01-01 PROCEDURE — 99285 EMERGENCY DEPT VISIT HI MDM: CPT | Mod: 25 | Performed by: EMERGENCY MEDICINE

## 2018-01-01 PROCEDURE — 82565 ASSAY OF CREATININE: CPT

## 2018-01-01 PROCEDURE — 99238 HOSP IP/OBS DSCHRG MGMT 30/<: CPT | Performed by: FAMILY MEDICINE

## 2018-01-01 PROCEDURE — 87081 CULTURE SCREEN ONLY: CPT | Performed by: FAMILY MEDICINE

## 2018-01-01 PROCEDURE — 25000128 H RX IP 250 OP 636: Performed by: RADIOLOGY

## 2018-01-01 PROCEDURE — 93010 ELECTROCARDIOGRAM REPORT: CPT | Mod: Z6 | Performed by: FAMILY MEDICINE

## 2018-01-01 PROCEDURE — 99214 OFFICE O/P EST MOD 30 MIN: CPT | Performed by: FAMILY MEDICINE

## 2018-01-01 PROCEDURE — A9270 NON-COVERED ITEM OR SERVICE: HCPCS | Mod: GY | Performed by: PHYSICIAN ASSISTANT

## 2018-01-01 PROCEDURE — 93005 ELECTROCARDIOGRAM TRACING: CPT | Performed by: FAMILY MEDICINE

## 2018-01-01 PROCEDURE — 99495 TRANSJ CARE MGMT MOD F2F 14D: CPT | Performed by: FAMILY MEDICINE

## 2018-01-01 PROCEDURE — 80053 COMPREHEN METABOLIC PANEL: CPT | Performed by: EMERGENCY MEDICINE

## 2018-01-01 RX ORDER — HYDROMORPHONE HCL/0.9% NACL/PF 0.2MG/0.2
0.2 SYRINGE (ML) INTRAVENOUS
Status: DISCONTINUED | OUTPATIENT
Start: 2018-01-01 | End: 2018-01-01 | Stop reason: HOSPADM

## 2018-01-01 RX ORDER — ONDANSETRON 2 MG/ML
4 INJECTION INTRAMUSCULAR; INTRAVENOUS EVERY 6 HOURS PRN
Status: DISCONTINUED | OUTPATIENT
Start: 2018-01-01 | End: 2018-01-01 | Stop reason: HOSPADM

## 2018-01-01 RX ORDER — HYDROCODONE BITARTRATE AND ACETAMINOPHEN 5; 325 MG/1; MG/1
1-2 TABLET ORAL EVERY 4 HOURS PRN
Status: DISCONTINUED | OUTPATIENT
Start: 2018-01-01 | End: 2018-01-01 | Stop reason: HOSPADM

## 2018-01-01 RX ORDER — DOXYCYCLINE 100 MG/1
100 CAPSULE ORAL 2 TIMES DAILY
Qty: 20 CAPSULE | Refills: 0 | Status: SHIPPED | OUTPATIENT
Start: 2018-01-01 | End: 2019-01-01

## 2018-01-01 RX ORDER — ZOLPIDEM TARTRATE 5 MG/1
5 TABLET ORAL
Qty: 1 TABLET | Refills: 0 | Status: SHIPPED | OUTPATIENT
Start: 2018-01-01 | End: 2019-01-01

## 2018-01-01 RX ORDER — LACTOBACILLUS RHAMNOSUS GG 10B CELL
1 CAPSULE ORAL DAILY
Status: DISCONTINUED | OUTPATIENT
Start: 2018-01-01 | End: 2018-01-01 | Stop reason: HOSPADM

## 2018-01-01 RX ORDER — CEFTRIAXONE SODIUM 2 G/50ML
2 INJECTION, SOLUTION INTRAVENOUS EVERY 24 HOURS
Status: DISCONTINUED | OUTPATIENT
Start: 2018-01-01 | End: 2018-01-01

## 2018-01-01 RX ORDER — AZITHROMYCIN 250 MG/1
250 TABLET, FILM COATED ORAL DAILY
Qty: 2 TABLET | Refills: 0 | Status: SHIPPED | OUTPATIENT
Start: 2018-01-01 | End: 2018-01-01

## 2018-01-01 RX ORDER — CALCIUM CARBONATE 500 MG/1
500 TABLET, CHEWABLE ORAL DAILY PRN
Status: DISCONTINUED | OUTPATIENT
Start: 2018-01-01 | End: 2018-01-01 | Stop reason: HOSPADM

## 2018-01-01 RX ORDER — FOLIC ACID 1 MG/1
1 TABLET ORAL DAILY
Status: DISCONTINUED | OUTPATIENT
Start: 2018-01-01 | End: 2018-01-01

## 2018-01-01 RX ORDER — NALOXONE HYDROCHLORIDE 0.4 MG/ML
.1-.4 INJECTION, SOLUTION INTRAMUSCULAR; INTRAVENOUS; SUBCUTANEOUS
Status: DISCONTINUED | OUTPATIENT
Start: 2018-01-01 | End: 2018-01-01

## 2018-01-01 RX ORDER — DEXAMETHASONE 1 MG
1 TABLET ORAL EVERY OTHER DAY
Status: CANCELLED | OUTPATIENT
Start: 2018-01-01

## 2018-01-01 RX ORDER — IOPAMIDOL 755 MG/ML
84 INJECTION, SOLUTION INTRAVASCULAR ONCE
Status: COMPLETED | OUTPATIENT
Start: 2018-01-01 | End: 2018-01-01

## 2018-01-01 RX ORDER — ACETAMINOPHEN 325 MG/1
650 TABLET ORAL EVERY 4 HOURS PRN
Status: DISCONTINUED | OUTPATIENT
Start: 2018-01-01 | End: 2018-01-01 | Stop reason: HOSPADM

## 2018-01-01 RX ORDER — ALBUTEROL SULFATE 0.83 MG/ML
2.5 SOLUTION RESPIRATORY (INHALATION)
Status: DISCONTINUED | OUTPATIENT
Start: 2018-01-01 | End: 2018-01-01 | Stop reason: HOSPADM

## 2018-01-01 RX ORDER — CHLORAL HYDRATE 500 MG
2 CAPSULE ORAL DAILY
Status: DISCONTINUED | OUTPATIENT
Start: 2018-01-01 | End: 2018-01-01

## 2018-01-01 RX ORDER — PREDNISONE 5 MG/ML
10 SOLUTION ORAL DAILY
COMMUNITY

## 2018-01-01 RX ORDER — MULTIPLE VITAMINS W/ MINERALS TAB 9MG-400MCG
1 TAB ORAL DAILY
Status: DISCONTINUED | OUTPATIENT
Start: 2018-01-01 | End: 2018-01-01 | Stop reason: HOSPADM

## 2018-01-01 RX ORDER — IPRATROPIUM BROMIDE AND ALBUTEROL SULFATE 2.5; .5 MG/3ML; MG/3ML
3 SOLUTION RESPIRATORY (INHALATION)
Status: DISCONTINUED | OUTPATIENT
Start: 2018-01-01 | End: 2018-01-01 | Stop reason: HOSPADM

## 2018-01-01 RX ORDER — NALOXONE HYDROCHLORIDE 0.4 MG/ML
.1-.4 INJECTION, SOLUTION INTRAMUSCULAR; INTRAVENOUS; SUBCUTANEOUS
Status: DISCONTINUED | OUTPATIENT
Start: 2018-01-01 | End: 2018-01-01 | Stop reason: HOSPADM

## 2018-01-01 RX ORDER — IOPAMIDOL 755 MG/ML
78 INJECTION, SOLUTION INTRAVASCULAR ONCE
Status: COMPLETED | OUTPATIENT
Start: 2018-01-01 | End: 2018-01-01

## 2018-01-01 RX ORDER — UREA 10 %
1000 LOTION (ML) TOPICAL DAILY
Status: DISCONTINUED | OUTPATIENT
Start: 2018-01-01 | End: 2018-01-01 | Stop reason: HOSPADM

## 2018-01-01 RX ORDER — SODIUM CHLORIDE, SODIUM LACTATE, POTASSIUM CHLORIDE, CALCIUM CHLORIDE 600; 310; 30; 20 MG/100ML; MG/100ML; MG/100ML; MG/100ML
INJECTION, SOLUTION INTRAVENOUS CONTINUOUS
Status: DISCONTINUED | OUTPATIENT
Start: 2018-01-01 | End: 2018-01-01

## 2018-01-01 RX ORDER — ASPIRIN 81 MG/1
81 TABLET ORAL DAILY
Status: DISCONTINUED | OUTPATIENT
Start: 2018-01-01 | End: 2018-01-01 | Stop reason: HOSPADM

## 2018-01-01 RX ORDER — ONDANSETRON 4 MG/1
4 TABLET, ORALLY DISINTEGRATING ORAL EVERY 8 HOURS PRN
Qty: 10 TABLET | Refills: 0 | Status: SHIPPED | OUTPATIENT
Start: 2018-01-01 | End: 2019-01-01

## 2018-01-01 RX ORDER — ONDANSETRON 4 MG/1
4 TABLET, ORALLY DISINTEGRATING ORAL EVERY 6 HOURS PRN
Status: DISCONTINUED | OUTPATIENT
Start: 2018-01-01 | End: 2018-01-01 | Stop reason: HOSPADM

## 2018-01-01 RX ADMIN — MULTIPLE VITAMINS W/ MINERALS TAB 1 TABLET: TAB at 08:38

## 2018-01-01 RX ADMIN — IOPAMIDOL 78 ML: 755 INJECTION, SOLUTION INTRAVENOUS at 13:16

## 2018-01-01 RX ADMIN — SODIUM CHLORIDE, POTASSIUM CHLORIDE, SODIUM LACTATE AND CALCIUM CHLORIDE: 600; 310; 30; 20 INJECTION, SOLUTION INTRAVENOUS at 00:12

## 2018-01-01 RX ADMIN — SODIUM CHLORIDE, POTASSIUM CHLORIDE, SODIUM LACTATE AND CALCIUM CHLORIDE: 600; 310; 30; 20 INJECTION, SOLUTION INTRAVENOUS at 04:56

## 2018-01-01 RX ADMIN — AZITHROMYCIN MONOHYDRATE 250 MG: 500 INJECTION, POWDER, LYOPHILIZED, FOR SOLUTION INTRAVENOUS at 12:27

## 2018-01-01 RX ADMIN — IOPAMIDOL 84 ML: 755 INJECTION, SOLUTION INTRAVENOUS at 12:05

## 2018-01-01 RX ADMIN — FOLIC ACID 1 MG: 1 TABLET ORAL at 08:39

## 2018-01-01 RX ADMIN — Medication 1000 MCG: at 08:39

## 2018-01-01 RX ADMIN — ENOXAPARIN SODIUM 40 MG: 40 INJECTION SUBCUTANEOUS at 16:52

## 2018-01-01 RX ADMIN — SODIUM CHLORIDE, POTASSIUM CHLORIDE, SODIUM LACTATE AND CALCIUM CHLORIDE 2451 ML: 600; 310; 30; 20 INJECTION, SOLUTION INTRAVENOUS at 14:12

## 2018-01-01 RX ADMIN — IPRATROPIUM BROMIDE AND ALBUTEROL SULFATE 3 ML: .5; 3 SOLUTION RESPIRATORY (INHALATION) at 15:51

## 2018-01-01 RX ADMIN — IPRATROPIUM BROMIDE AND ALBUTEROL SULFATE 3 ML: .5; 3 SOLUTION RESPIRATORY (INHALATION) at 19:52

## 2018-01-01 RX ADMIN — VITAMIN D, TAB 1000IU (100/BT) 1000 UNITS: 25 TAB at 08:48

## 2018-01-01 RX ADMIN — SODIUM CHLORIDE, POTASSIUM CHLORIDE, SODIUM LACTATE AND CALCIUM CHLORIDE: 600; 310; 30; 20 INJECTION, SOLUTION INTRAVENOUS at 15:31

## 2018-01-01 RX ADMIN — IPRATROPIUM BROMIDE AND ALBUTEROL SULFATE 3 ML: .5; 3 SOLUTION RESPIRATORY (INHALATION) at 20:31

## 2018-01-01 RX ADMIN — SODIUM CHLORIDE, POTASSIUM CHLORIDE, SODIUM LACTATE AND CALCIUM CHLORIDE: 600; 310; 30; 20 INJECTION, SOLUTION INTRAVENOUS at 14:28

## 2018-01-01 RX ADMIN — CEFTRIAXONE SODIUM 2 G: 2 INJECTION, SOLUTION INTRAVENOUS at 14:21

## 2018-01-01 RX ADMIN — AZITHROMYCIN MONOHYDRATE 500 MG: 500 INJECTION, POWDER, LYOPHILIZED, FOR SOLUTION INTRAVENOUS at 12:57

## 2018-01-01 RX ADMIN — CALCIUM CARBONATE (ANTACID) CHEW TAB 500 MG 500 MG: 500 CHEW TAB at 19:57

## 2018-01-01 RX ADMIN — VITAMIN D, TAB 1000IU (100/BT) 1000 UNITS: 25 TAB at 08:39

## 2018-01-01 RX ADMIN — ACETAMINOPHEN 650 MG: 325 TABLET, FILM COATED ORAL at 22:00

## 2018-01-01 RX ADMIN — SODIUM CHLORIDE, POTASSIUM CHLORIDE, SODIUM LACTATE AND CALCIUM CHLORIDE: 600; 310; 30; 20 INJECTION, SOLUTION INTRAVENOUS at 22:30

## 2018-01-01 RX ADMIN — IPRATROPIUM BROMIDE AND ALBUTEROL SULFATE 3 ML: .5; 3 SOLUTION RESPIRATORY (INHALATION) at 12:52

## 2018-01-01 RX ADMIN — Medication 1000 MCG: at 08:49

## 2018-01-01 RX ADMIN — SODIUM CHLORIDE 58 ML: 9 INJECTION, SOLUTION INTRAVENOUS at 12:05

## 2018-01-01 RX ADMIN — SODIUM CHLORIDE, POTASSIUM CHLORIDE, SODIUM LACTATE AND CALCIUM CHLORIDE 1000 ML: 600; 310; 30; 20 INJECTION, SOLUTION INTRAVENOUS at 12:53

## 2018-01-01 RX ADMIN — CEFTRIAXONE SODIUM 2 G: 2 INJECTION, SOLUTION INTRAVENOUS at 11:52

## 2018-01-01 RX ADMIN — ASPIRIN 81 MG: 81 TABLET, COATED ORAL at 08:48

## 2018-01-01 RX ADMIN — IPRATROPIUM BROMIDE AND ALBUTEROL SULFATE 3 ML: .5; 3 SOLUTION RESPIRATORY (INHALATION) at 11:18

## 2018-01-01 RX ADMIN — SODIUM CHLORIDE 102 ML: 9 INJECTION, SOLUTION INTRAVENOUS at 13:16

## 2018-01-01 RX ADMIN — IPRATROPIUM BROMIDE AND ALBUTEROL SULFATE 3 ML: .5; 3 SOLUTION RESPIRATORY (INHALATION) at 16:03

## 2018-01-01 RX ADMIN — ENOXAPARIN SODIUM 40 MG: 40 INJECTION SUBCUTANEOUS at 16:39

## 2018-01-01 RX ADMIN — Medication 1 CAPSULE: at 12:58

## 2018-01-01 RX ADMIN — MULTIPLE VITAMINS W/ MINERALS TAB 1 TABLET: TAB at 08:48

## 2018-01-01 RX ADMIN — ASPIRIN 81 MG: 81 TABLET, COATED ORAL at 08:39

## 2018-01-01 ASSESSMENT — ACTIVITIES OF DAILY LIVING (ADL)
DEPENDENT_IADLS:: INDEPENDENT

## 2018-01-01 ASSESSMENT — ENCOUNTER SYMPTOMS
SORE THROAT: 0
WEAKNESS: 1
SINUS PRESSURE: 0
NAUSEA: 1
DIZZINESS: 1
SLEEP DISTURBANCE: 1
CONSTIPATION: 0
FATIGUE: 1
FREQUENCY: 0
HEADACHES: 0
DIAPHORESIS: 0
APPETITE CHANGE: 1
PALPITATIONS: 0
ABDOMINAL PAIN: 0
VOMITING: 0
CHILLS: 0
MYALGIAS: 0
DIARRHEA: 0
COUGH: 0
FEVER: 1
DYSURIA: 0
SHORTNESS OF BREATH: 0
BLOOD IN STOOL: 0
WHEEZING: 0

## 2018-01-29 ENCOUNTER — TRANSFERRED RECORDS (OUTPATIENT)
Dept: HEALTH INFORMATION MANAGEMENT | Facility: CLINIC | Age: 73
End: 2018-01-29

## 2018-03-16 ENCOUNTER — TRANSFERRED RECORDS (OUTPATIENT)
Dept: HEALTH INFORMATION MANAGEMENT | Facility: CLINIC | Age: 73
End: 2018-03-16

## 2018-03-20 ENCOUNTER — TRANSFERRED RECORDS (OUTPATIENT)
Dept: HEALTH INFORMATION MANAGEMENT | Facility: CLINIC | Age: 73
End: 2018-03-20

## 2018-05-11 PROBLEM — J18.9 CAP (COMMUNITY ACQUIRED PNEUMONIA): Status: ACTIVE | Noted: 2018-01-01

## 2018-05-11 PROBLEM — J18.9 PNEUMONIA: Status: ACTIVE | Noted: 2018-01-01

## 2018-05-11 NOTE — IP AVS SNAPSHOT
MRN:[id number]                      After Visit Summary   5/11/2018    Juan Choudhary    MRN: [id number]           Thank you!     Thank you for choosing [facility] for your care. Our goal is always to provide you with excellent care. Hearing back from our patients is one way we can continue to improve our services. Please take a few minutes to complete the written survey that you may receive in the mail after you visit with us. Thank you!        Patient Information     Date Of Birth          1945        Designated Caregiver       Most Recent Value    Caregiver    Will someone help with your care after discharge? no    Name of designated caregiver WifeAlonzo    Caregiver address [facility]      About your hospital stay     You were admitted on:  May 11, 2018 You last received care in the:  [facility]    You were discharged on:  May 13, 2018       Who to Call     For medical emergencies, please call 911.  For non-urgent questions about your medical care, please call your primary care provider or clinic, [phone]          Attending Provider     Provider Specialty    Reyes Almeida MD Emergency Medicine    [facility], Henry REGAN MD [facility], Parker Vera MD [facility]    Sol, Roxanne Gamboa MD [facility]       Primary Care Provider Office Phone # Fax #    GIRISH Soto [phone]      After Care Instructions     Activity       Your activity upon discharge: activity as tolerated            Diet       Follow this diet upon discharge: Orders Placed This Encounter      Combination Diet Regular Diet Adult                  Follow-up Appointments     Follow-up and recommended labs and tests        Follow up with primary care provider, GIRISH Soto, within 7 days for hospital follow- up.                  Further instructions from your care team       As a system [facility] wants to ensure that across the care continuum that you have  "support through care coordination services that include nurses and social workers in the outpatient setting.  Due to your pneumonia I feel it is important you have this support when you discharge.  They will be calling you within 24-48 hours of your discharge. A brochure describing the services was provided.  If you have questions you can reach out to your clinic directly and ask for the Care Coordinator assigned to your care.  Rebeka Duong RN, Care Coordinator [phone]    Pending Results     Date and Time Order Name Status Description    5/11/2018 1525 Methicillin resistant staph aureus cult Preliminary     5/11/2018 1236 Blood culture Preliminary     5/11/2018 1236 Blood culture Preliminary             Statement of Approval     Ordered          05/13/18 1219  I have reviewed and agree with all the recommendations and orders detailed in this document.  EFFECTIVE NOW     Approved and electronically signed by:  Roxanne Horton MD             Admission Information     Date & Time Provider Department Dept. Phone    5/11/2018 Roxanne Horton MD [facility] Intensive [facility] [phone]      Your Vitals Were     Blood Pressure Pulse Temperature Respirations Weight Pulse Oximetry    109/72 (BP Location: Left arm) 76 98.2  F (36.8  C) (Oral) 16 78 kg (171 lb 15.3 oz) 97%    BMI (Body Mass Index)                   22.08 kg/m2           MyChart Information     [employer] lets you send messages to your doctor, view your test results, renew your prescriptions, schedule appointments and more. To sign up, go to [email or web address] . Click on \"Log in\" on the left side of the screen, which will take you to the Welcome page. Then click on \"Sign up Now\" on the right side of the page.     You will be asked to enter the access code listed below, as well as some personal information. Please follow the directions to create your username and password.     Your access code is: [email or web address]  Expires: 8/9/2018  1:21 " PM     Your access code will  in 90 days. If you need help or a new code, please call your [place] clinic or [phone].        Care EveryWhere ID     This is your Care EveryWhere ID. This could be used by other organizations to access your [place] medical records  [id number]        Equal Access to Services     SATISH MCKENNA : Hadvicki mckeon hadlennieo Sojeanieali, waaxda luqadaha, qaybta kaalmada heracliodidiailin, mony funeshusseinyoel mahajan. So [facility] [phone].    ATENCIÓN: Si habla español, tiene a lopez disposición servicios gratuitos de asistencia lingüística. Richard al [phone].    We comply with applicable federal civil rights laws and [place] laws. We do not discriminate on the basis of race, color, national origin, age, disability, sex, sexual orientation, or gender identity.               Review of your medicines      START taking        Dose / Directions    acidophilus tablet   Used for:  Diarrhea, unspecified type        Dose:  1 tablet   Take 1 tablet by mouth daily   Quantity:  30 tablet   Refills:  0       azithromycin 250 MG tablet   Commonly known as:  ZITHROMAX   Used for:  Pneumonia of both lungs due to infectious organism, unspecified part of lung        Dose:  250 mg   Start taking on:  2018   Take 1 tablet (250 mg) by mouth daily for 1 day TAKE 1st tablet on Monday, May 14th.   Quantity:  2 tablet   Refills:  0         CONTINUE these medicines which have NOT CHANGED        Dose / Directions    ASPIRIN EC PO        Dose:  81 mg   Take 81 mg by mouth daily   Refills:  0       B-12 1000 MCG Caps        Dose:  1000 mcg   Take 1,000 mcg by mouth daily   Refills:  0       CENTRUM SILVER per tablet        Dose:  1 tablet   Take 1 tablet by mouth daily (with lunch)   Refills:  0       NEW MED        Dose:  1 oz   Take 1 oz by mouth daily (with lunch) Tart cherry juice  - mixed with water   Refills:  0       VITAMIN D PO        Dose:  1000 Units   Take 1,000 Units by mouth daily    Refills:  0         STOP taking     fish oil-omega-3 fatty acids 1000 MG capsule           folic acid 1 MG tablet   Commonly known as:  FOLVITE                Where to get your medicines      These medications were sent [place] [facility] [place] - [address]  [address] [address]     Phone:  [phone]     acidophilus tablet    azithromycin 250 MG tablet                Protect others around you: Learn how to safely use, store and throw away your medicines at www.disposemymeds.org.        ANTIBIOTIC INSTRUCTION     You've Been Prescribed an Antibiotic - Now What?  Your healthcare team thinks that you or your loved one might have an infection. Some infections can be treated with antibiotics, which are powerful, life-saving drugs. Like all medications, antibiotics have side effects and should only be used when necessary. There are some important things you should know about your antibiotic treatment.      Your healthcare team may run tests before you start taking an antibiotic.    Your team may take samples (e.g., from your blood, urine or other areas) to run tests to look for bacteria. These test can be important to determine if you need an antibiotic at all and, if you do, which antibiotic will work best.      Within a few days, your healthcare team might change or even stop your antibiotic.    Your team may start you on an antibiotic while they are working to find out what is making you sick.    Your team might change your antibiotic because test results show that a different antibiotic would be better to treat your infection.    In some cases, once your team has more information, they learn that you do not need an antibiotic at all. They may find out that you don't have an infection, or that the antibiotic you're taking won't work against your infection. For example, an infection caused by a virus can't be treated with antibiotics. Staying on an antibiotic when you don't  need it is more likely to be harmful than helpful.      You may experience side effects from your antibiotic.    Like all medications, antibiotics have side effects. Some of these can be serious.    Let you healthcare team know if you have any known allergies when you are admitted to the hospital.    One significant side effect of nearly all antibiotics is the risk of severe and sometimes deadly diarrhea caused by Clostridium difficile (C. Difficile). This occurs when a person takes antibiotics because some good germs are destroyed. Antibiotic use allows C. diificile to take over, putting patients at high risk for this serious infection.    As a patient or caregiver, it is important to understand your or your loved one's antibiotic treatment. It is especially important for caregivers to speak up when patients can't speak for themselves. Here are some important questions to ask your healthcare team.    What infection is this antibiotic treating and how do you know I have that infection?    What side effects might occur from this antibiotic?    How long will I need to take this antibiotic?    Is it safe to take this antibiotic with other medications or supplements (e.g., vitamins) that I am taking?     Are there any special directions I need to know about taking this antibiotic? For example, should I take it with food?    How will I be monitored to know whether my infection is responding to the antibiotic?    What tests may help to make sure the right antibiotic is prescribed for me?      Information provided by:  www.cdc.gov/getsmart  U.S. Department of Health and Human Services  Centers for disease Control and Prevention  National Center for Emerging and Zoonotic Infectious Diseases  Division of Healthcare Quality Promotion             Medication List: This is a list of all your medications and when to take them. Check marks below indicate your daily home schedule. Keep this list as a reference.      Medications            Morning Afternoon Evening Bedtime As Needed    acidophilus tablet   Take 1 tablet by mouth daily                                   ASPIRIN EC PO   Take 81 mg by mouth daily   Last time this was given:  81 mg on 5/13/2018  8:39 AM                                   azithromycin 250 MG tablet   Commonly known as:  ZITHROMAX   Take 1 tablet (250 mg) by mouth daily for 1 day TAKE 1st tablet on Monday, May 14th.   Start taking on:  5/14/2018                                   B-12 1000 MCG Caps   Take 1,000 mcg by mouth daily                                   CENTRUM SILVER per tablet   Take 1 tablet by mouth daily (with lunch)   Last time this was given:  1 tablet on 5/13/2018  8:38 AM                                   NEW MED   Take 1 oz by mouth daily (with lunch) Tart cherry juice  - mixed with water                                   VITAMIN D PO   Take 1,000 Units by mouth daily   Last time this was given:  1,000 Units on 5/13/2018  8:39 AM

## 2018-05-11 NOTE — LETTER
Transition Communication Hand-off for Care Transitions to Next Level of Care Provider    Name: Juan Choudhary  : 1945  MRN #: [id number]  Primary Care Provider: GIRISH Soto  Primary Care MD Name: Dr. Soto  Primary Clinic: [address] [facility] [address]  Primary Care Clinic Name: [facility]  Reason for Hospitalization:  Adenocarcinoma of lung, right (H) [C34.91]  Pneumonia of both lungs due to infectious organism, unspecified part of lung [J18.9]  Admit Date/Time: 2018 11:22 AM  Discharge Date: 18  Payor Source: Payor: BCBS / Plan: BCBS PLATINUM BLUE / Product Type: PPO /     Readmission Assessment Measure (JULIO CESAR) Risk Score/category: Low        Reason for Communication Hand-off Referral: Admission diagnoses: PN    Discharge Plan:  Home       Concern for non-adherence with plan of care:   Y/N No            Key Recommendations:  Care Transitions was consulted for a diagnosis of pneumonia and lung cancer.  The patient lives independently in the community. He does not use home O2.  Declined the need for [employer].  Provided pneumonia education resources.  There are no discharge needs identified.  The patient agreed to enroll in Clinic Care Coordination.        Rebeka Duong RN, Care Coordinator    AVS/Discharge Summary is the source of truth; this is a helpful guide for improved communication of patient story

## 2018-05-11 NOTE — ED PROVIDER NOTES
"  History     Chief Complaint   Patient presents with     Cough     saw Dr Soto and sent here due to pneumonia     The history is provided by the patient and medical records.     Juan Choudhary is a 72 year old male with adenocarcinoma of right lung who presents to the ED for evaluation of a cough. Patient was seen in the clinic this morning and was sent to the ED for admission of pneumonia. He has been coughing for quite some time, worsening in the past week. He has been coughing up lots of phlegm. He hasn't been able to sleep well due to the cough. When he was walking into the ED he felt short of breath and dizzy. Was running a slight temperature in the clinic, but that has resolved upon arrival. Has not ate anything today, but had a couple of good meals yesterday. No recent hospitalization. Denies any recent chemotherapy or radiation. Does not use 02 at home.      Denies vomiting, nausea, dysuria, hematuria, diarrhea, black or bloody stools or hemoptysis.       Problem List:    Patient Active Problem List    Diagnosis Date Noted     Pneumonia 05/11/2018     Priority: Medium     CAP (community acquired pneumonia) 05/11/2018     Priority: Medium     Adenocarcinoma of lung, right (H) 09/08/2017     Priority: Medium     Right lower lobectomy at [facility] 2/11       Malignant neoplasm of lower lobe of right lung (H) 01/13/2017     Priority: Medium     Right lower lobe pneumonia (H) 01/13/2017     Priority: Medium     Lung cancer, lower lobe (H) 01/13/2017     Priority: Medium     Health Care Home 12/05/2013     Priority: Medium     Given basic care plan on the visit of 12/5/13       Advanced directives, counseling/discussion 12/07/2012     Priority: Medium     Patient does not have an Advance/Health Care Directive (HCD), given \"What is Advance Care Planning?\" flyer.    Mónica Maurice  December 7, 2012         CARDIOVASCULAR SCREENING; LDL GOAL LESS THAN 160 10/31/2010     Priority: Medium     Internal " bleeding hemorrhoids 01/06/2009     Priority: Medium     ESOPHAGEAL REFLUX 02/14/2008     Priority: Medium     Scope Feb 2008          Past Medical History:    Past Medical History:   Diagnosis Date     Esophageal reflux        Past Surgical History:    Past Surgical History:   Procedure Laterality Date     HERNIA REPAIR, INGUINAL RT/LT  2004     LOBECTOMY  2/11    right lower lobe, for adenoca     SURGICAL HISTORY OF -   1/11    Bronchoscopy ([facility])     SURGICAL HISTORY OF -   2/10    Bronchoscopy [facility]     VASECTOMY  1980       Family History:    Family History   Problem Relation Age of Onset     CANCER Mother      lung     HEART DISEASE Father      HEART DISEASE Sister      Prostate Cancer Brother      Melanoma No family hx of        Social History:  Marital Status:   [2]  Social History   Substance Use Topics     Smoking status: Never Smoker     Smokeless tobacco: Never Used     Alcohol use Yes      Comment: socially        Medications:      No current outpatient prescriptions on file.      Review of Systems    All other systems are reviewed and are negative.    Physical Exam   BP: 90/55  Pulse: 89  Heart Rate: 98  Temp: 98.3  F (36.8  C)  Resp: 14  SpO2: 94 %      Physical Exam  Nontoxic appearing no respiratory distress alert and oriented ×3, chronically ill-appearing  Head atraumatic normocephalic  Conjunctiva noninjected, oropharynx moist without lesions or erythema  No cervical adenopathy   Neck supple full active painless range of motion  Lungs diminished breath sounds throughout, rales/rhonchi right lower field, less pronounced left mid to lower field  Heart regular no murmur  Abdomen soft nontender bowel sounds positive no masses or HSM  Strength and sensation grossly intact throughout the extremities, gait and station normal  Speech is fluent, good eye contact, thought processes are rational  Lower extremities without swelling, redness or tenderness  Pedal pulses symmetrical and strong    ED  Course     ED Course     Procedures               Critical Care time:  none               Results for orders placed or performed during the hospital encounter of 05/11/18 (from the past 24 hour(s))   CBC with platelets differential   Result Value Ref Range    WBC 3.1 (L) 4.0 - 11.0 10e9/L    RBC Count 4.42 4.4 - 5.9 10e12/L    Hemoglobin 12.5 (L) 13.3 - 17.7 g/dL    Hematocrit 37.7 (L) 40.0 - 53.0 %    MCV 85 78 - 100 fl    MCH 28.3 26.5 - 33.0 pg    MCHC 33.2 31.5 - 36.5 g/dL    RDW 13.9 10.0 - 15.0 %    Platelet Count 150 150 - 450 10e9/L    Diff Method Automated Method     % Neutrophils 65.2 %    % Lymphocytes 18.4 %    % Monocytes 13.9 %    % Eosinophils 1.6 %    % Basophils 0.6 %    % Immature Granulocytes 0.3 %    Absolute Neutrophil 2.0 1.6 - 8.3 10e9/L    Absolute Lymphocytes 0.6 (L) 0.8 - 5.3 10e9/L    Absolute Monocytes 0.4 0.0 - 1.3 10e9/L    Absolute Eosinophils 0.1 0.0 - 0.7 10e9/L    Absolute Basophils 0.0 0.0 - 0.2 10e9/L    Abs Immature Granulocytes 0.0 0 - 0.4 10e9/L   Comprehensive metabolic panel   Result Value Ref Range    Sodium 135 133 - 144 mmol/L    Potassium 4.1 3.4 - 5.3 mmol/L    Chloride 101 94 - 109 mmol/L    Carbon Dioxide 28 20 - 32 mmol/L    Anion Gap 6 3 - 14 mmol/L    Glucose 90 70 - 99 mg/dL    Urea Nitrogen 17 7 - 30 mg/dL    Creatinine 1.02 0.66 - 1.25 mg/dL    GFR Estimate 71 >60 mL/min/1.7m2    GFR Estimate If Black 86 >60 mL/min/1.7m2    Calcium 8.3 (L) 8.5 - 10.1 mg/dL    Bilirubin Total 0.7 0.2 - 1.3 mg/dL    Albumin 3.5 3.4 - 5.0 g/dL    Protein Total 7.5 6.8 - 8.8 g/dL    Alkaline Phosphatase 61 40 - 150 U/L    ALT 28 0 - 70 U/L    AST 26 0 - 45 U/L   Lactic acid whole blood   Result Value Ref Range    Lactic Acid 0.9 0.7 - 2.0 mmol/L   Blood culture   Result Value Ref Range    Specimen Description Blood Right Arm     Special Requests Aerobic and anaerobic bottles received     Culture Micro No growth after 2 hours    Blood culture   Result Value Ref Range    Specimen  Description Blood Left Arm     Special Requests Aerobic and anaerobic bottles received     Culture Micro No growth after 2 hours    Blood gas venous   Result Value Ref Range    Ph Venous 7.38 7.32 - 7.43 pH    PCO2 Venous 48 40 - 50 mm Hg    PO2 Venous PENDING 25 - 47 mm Hg    Bicarbonate Venous 28 21 - 28 mmol/L    Base Excess Venous 2.5 mmol/L   Chest CT - IV contrast only - PE protocol    Narrative    CT CHEST PULMONARY EMBOLISM WITH CONTRAST  5/11/2018 1:22 PM    HISTORY: Cough, dyspnea. History of nonsmall cell lung ca; history of  right lower lobectomy 2/1/2011 for adenocarcinoma.    TECHNIQUE: Axial images from thoracic inlet to diaphragm. 78 mL  Isovue-370 IV contrast  Radiation dose for this scan was reduced using  automated exposure control, adjustment of the mA and/or kV according  to patient size, or iterative reconstruction technique.    COMPARISON: 5/11/2018 chest x-ray    FINDINGS:  Postoperative changes in the right hilum and chest  consistent with history of lobectomy with shift of mediastinal  structures into the right consistent with volume loss. There is a  large area of consolidation in the right mid lung extending to the  hilum approximately 7 cm in diameter with small air-filled cystic  areas or air bronchograms within it. Numerous additional cysts are  identified in the more peripheral posteromedial right lower lung.  Small right pleural effusion. There is a similar area of opacity in  the left upper lobe with air bronchograms extending laterally from the  left hilum approximately 8 cm transverse x 5 cm AP dimension. There  are additional small indeterminate nodular densities in the left lung  such as 0.5 cm nodule, series 5 image 39, mildly spiculated 1.2 cm  left upper lobe nodule, image 42, and multiple small nodules at the  periphery of the left upper lobe consolidation. Larger 2 cm focal  nodular opacity, image 74, along the anterior medial aspect of the  consolidation. These bilateral  opacities are nonspecific, could  represent pneumonia or other infectious/inflammatory process; however,  this is worrisome for recurrent neoplasm. The right lower lobe opacity  on 12/17/2010 CT had a similar appearance.    The masslike opacity on the right extends to the right main pulmonary  artery. Cannot rule out extension into the distal right main pulmonary  artery, but there is no postop CT to demonstrate the hilum following  surgery. Small filling defect, series 4 image 75, posterior right  pulmonary artery could be a small pulmonary embolus or extension of  this process into the right pulmonary artery. Discussed with Dr. Reyes Almeida, 2:00 PM on 5/11/2018. There is aorticopulmonary window  adenopathy, right paratracheal adenopathy. Moderate hiatal hernia.  Mild 1 cm nodular thickening of left adrenal gland. Indeterminate 2.2  cm hypodense lesion in the posterior right liver of the liver,  previously 1.4 cm on 12/17/2010. A couple additional low-dense liver  lesions in the right lobe on series 4 image 122 are new. 1 cm  hypodense lesion in the left lobe, image 133, is not significantly  changed. No aggressive bone lesions.      Impression    IMPRESSION:  1. Small right effusion and focal right heterogeneous lung opacity  extends to the right hilum and possibly into the right pulmonary  artery, cannot exclude a small pulmonary embolus versus extension of  this process into the pulmonary artery. Concerning for tumor. Cannot  exclude dense consolidation due to infection.  2. Smaller but similar area of left upper lobe opacity with multiple  small nodular densities on the left, some appearing spiculated, again  very concerning for tumor with infection not excluded.  3. Aorticopulmonary window and mild paratracheal adenopathy.  4. 1 cm left adrenal nodule.  5. Few liver lesions are new; others were present and new or stable  since 2010. Incompletely characterized.  6. Hiatal hernia.    JIM UP MD        Medications   lactated ringers infusion ( Intravenous New Bag 5/11/18 1531)   cefTRIAXone IN D5W (ROCEPHIN) intermittent infusion 2 g (2 g Intravenous New Bag 5/11/18 1421)   azithromycin (ZITHROMAX) 250 mg in sodium chloride 0.9 % 250 mL intermittent infusion (not administered)   aspirin EC tablet 81 mg (81 mg Oral Not Given 5/11/18 1631)   cholecalciferol (vitamin D3) tablet 1,000 Units (1,000 Units Oral Not Given 5/11/18 1632)   cyanocolbalamin (vitamin  B-12) tablet 1,000 mcg (1,000 mcg Oral Not Given 5/11/18 1632)   folic acid (FOLVITE) tablet 1 mg (1 mg Oral Not Given 5/11/18 1632)   multivitamin, therapeutic with minerals (THERA-VIT-M) tablet 1 tablet (1 tablet Oral Not Given 5/11/18 1633)   fish oil-omega-3 fatty acids capsule 2 g (2 g Oral Not Given 5/11/18 1632)   melatonin tablet 1 mg (not administered)   enoxaparin (LOVENOX) injection 40 mg (40 mg Subcutaneous Given 5/11/18 1639)   acetaminophen (TYLENOL) tablet 650 mg (not administered)   HYDROcodone-acetaminophen (NORCO) 5-325 MG per tablet 1-2 tablet (not administered)   HYDROmorphone (DILAUDID) injection 0.2 mg (not administered)   ondansetron (ZOFRAN-ODT) ODT tab 4 mg (not administered)     Or   ondansetron (ZOFRAN) injection 4 mg (not administered)   ipratropium - albuterol 0.5 mg/2.5 mg/3 mL (DUONEB) neb solution 3 mL (3 mLs Nebulization Given 5/11/18 1551)   albuterol neb solution 2.5 mg (not administered)   naloxone (NARCAN) injection 0.1-0.4 mg (not administered)   lactated ringers BOLUS 2,451 mL (2,451 mLs Intravenous New Bag 5/11/18 1412)   azithromycin (ZITHROMAX) 500 mg in sodium chloride 0.9 % 250 mL intermittent infusion (0 mg Intravenous Stopped 5/11/18 1411)   iopamidol (ISOVUE-370) solution 78 mL (78 mLs Intravenous Given 5/11/18 1316)   sodium chloride 0.9 % bag 500mL for CT scan flush use (102 mLs Intravenous Given 5/11/18 1316)       12:40 PM Patient assessed.  Assessments & Plan (with Medical Decision Making)  72-year-old  male with history of known non-small cell lung cancer presents with cough and shortness of air details per HPI.  CT scan chest significant for left upper and right lower consolidations, question neoplasm versus infection versus both.  Reported fever, none here.  White count below 3.1.  Remainder of labs unremarkable.  Treated with IV fluid, Zithromax/Rocephin.  Admit to internal medicine, reviewed with  who accepts patient for admission.     I have reviewed the nursing notes.    I have reviewed the findings, diagnosis, plan and need for follow up with the patient.       Current Discharge Medication List          Final diagnoses:   Pneumonia of both lungs due to infectious organism, unspecified part of lung   Adenocarcinoma of lung, right (H)     This document serves as a record of services personally performed by Reyes Almeida MD. It was created on their behalf by Barb Anthony, a trained medical scribe. The creation of this record is based on the provider's personal observations and the statements of the patient. This document has been checked and approved by the attending provider.    Note: Chart documentation done in part with Dragon Voice Recognition software. Although reviewed after completion, some word and grammatical errors may remain.        5/11/2018   [facility] EMERGENCY DEPARTMENT     Reyes Almeida MD  05/11/18 3589

## 2018-05-11 NOTE — PROGRESS NOTES
Writer has reviewed initial/admission skin assessment and Allen assessment and agrees with documentation and assessment findings.

## 2018-05-11 NOTE — ED NOTES
Presents stating was sent in for admission by Dr Soto who talked to someone here, call placed to [place] charge to verify if is a direct admission which he is not, will triage to ED for further evaluation

## 2018-05-11 NOTE — IP AVS SNAPSHOT
[facility]    [address] [address]    Phone:  [phone]    Fax:  [phone]                                       After Visit Summary   5/11/2018    Juan Choudhary    MRN: [id number]           After Visit Summary Signature Page     I have received my discharge instructions, and my questions have been answered. I have discussed any challenges I see with this plan with the nurse or doctor.    ..........................................................................................................................................  Patient/Patient Representative Signature      ..........................................................................................................................................  Patient Representative Print Name and Relationship to Patient    ..................................................               ................................................  Date                                            Time    ..........................................................................................................................................  Reviewed by Signature/Title    ...................................................              ..............................................  Date                                                            Time

## 2018-05-11 NOTE — PROGRESS NOTES
[facility] [place]    Patient admitted to room 1007 at approximately 1530 via cart from emergency room. Patient was accompanied by nurse.     Verbal SBAR report received from RN prior to patient arrival.     Patient ambulated to bed independently. Patient alert and oriented X 3. The patient is not having any pain.  . Admission vital signs: Blood pressure 126/79, pulse 93, temperature 98.9  F (37.2  C), temperature source Oral, resp. rate 14, SpO2 95 %. Patient was oriented to plan of care, visiting hours.     Risk Assessment    The following safety risks were identified during admission: skin. Yellow risk band applied: NO.     Skin Initial Assessment    This writer admitted this patient and completed a full skin assessment and Allen score in the Adult PCS flowsheet. Appropriate interventions initiated as needed.     Secondary skin check completed by Logan CHANG RN.    Skin  Inspection of bony prominences: Full  Inspection under devices: Full  Skin WDL: WDL  Skin Color/Characteristics: pale    Allen Risk Assessment  Sensory Perception: 3-->slightly limited  Moisture: 4-->rarely moist  Activity: 4-->walks frequently  Mobility: 4-->no limitation  Nutrition: 3-->adequate  Friction and Shear: 3-->no apparent problem  Allen Score: 21    Janny Hung

## 2018-05-11 NOTE — PROGRESS NOTES
"  SUBJECTIVE:  Juan Choudhary is a 72 year old male who presents with the following concerns;              Symptoms: cc Present Absent Comment   Fever/Chills  x  chills   Fatigue  x     Muscle Aches    weakness   Eye Irritation   x    Sneezing  x  some   Nasal Mckinley/Drg  x  Nasal 5-6 months   Sinus Pressure/Pain   x    Loss of smell   x    Dental pain   x    Sore Throat  x  Raw feeling from coughing so much?   Swollen Glands   x    Ear Pain/Fullness   x    Cough x x  Productive of some whitish thin sputum; he actually brought some of this in in the plastic water bottle for me to examine   Wheeze  x     Chest Pain  x  Depending on which way pt. Is laying down   Shortness of breath  x     Rash   x    Other     he became very weak and dizzy when my assistant brought him back into the clinic to be weighed, needed to sit down or he thought he might pass out     Symptom duration:  x 4-5 days   Sympom severity:  not getting any better   Treatments tried:  none   Contacts:  none       Medications updated and reviewed.  Past, family and surgical history is updated and reviewed in the record.  ROS:  Other than noted above, general, HEENT, respiratory, cardiac and gastrointestinal systems are negative.  OBJECTIVE:BP (!) 65/46  Pulse 100  Temp 100.2  F (37.9  C) (Tympanic)  Ht 6' 2\" (1.88 m)  Wt 180 lb 3.2 oz (81.7 kg)  SpO2 96%  BMI 23.14 kg/m2   GENERAL APPEARANCE ADULT: thin, cachectic, pale  EYES:  PERRL, EOM normal, conjunctiva and lids normal  HEENT:  Ears and TMs normal, oral mucosa and posterior oropharynx normal  NECK:  No adenopathy,masses or thyromegaly.  RESP: lungs clear to auscultation , a few small squeaky wheezes in the right posterior lung  CV: normal rate, regular rhythm, no murmur or gallop.    Chest x-ray: Extensive infiltrate involving the right lower lobe and some infiltrate in the left medial lung, new from our most recent chest x-ray    ASSESSMENT:  1. Community acquired pneumonia of right lower " lobe of lung (H)    2. Cough    3. Malignant neoplasm of lower lobe of right lung (H)      Is very weak with his hypotension, presumably due to dehydration from inadequate oral intake the last few days    PLAN:  Orders Placed This Encounter     XR Chest 2 Views     I told him that we probably should admit him to the hospital because of his hypotension and weakness and he agreed.  He was taken by wheelchair out to the car where his wife will drive him directly to the emergency room.  I called the emergency room and explained the situation to Dr. Godfrey.

## 2018-05-11 NOTE — MR AVS SNAPSHOT
"              After Visit Summary   5/11/2018    Juan Choudhary    MRN: [id number]           Patient Information     Date Of Birth          1945        Visit Information        Provider Department      5/11/2018 9:40 AM GIRISH Soto MD [facility]        Today's Diagnoses     Community acquired pneumonia of right lower lobe of lung (H)    -  1    Cough        Malignant neoplasm of lower lobe of right lung (H)           Follow-ups after your visit        Future tests that were ordered for you today     Open Standing Orders        Priority Remaining Interval Expires Ordered    Oxygen: Nasal cannula, Oxygen mask STAT 85002/90139 CONTINUOUS  5/11/2018            Who to contact     If you have questions or need follow up information about today's clinic visit or your schedule please contact [facility] directly at [phone].  Normal or non-critical lab and imaging results will be communicated to you by MyChart, letter or phone within 4 business days after the clinic has received the results. If you do not hear from us within 7 days, please contact the clinic through MyChart or phone. If you have a critical or abnormal lab result, we will notify you by phone as soon as possible.  Submit refill requests through [address] or call your pharmacy and they will forward the refill request to us. Please allow 3 business days for your refill to be completed.          Additional Information About Your Visit        MyChart Information     [address] lets you send messages to your doctor, view your test results, renew your prescriptions, schedule appointments and more. To sign up, go to [email or web address] . Click on \"Log in\" on the left side of the screen, which will take you to the Welcome page. Then click on \"Sign up Now\" on the right side of the page.     You will be asked to enter the access code listed below, as well as some personal information. Please follow the directions to " "create your username and password.     Your access code is: [email or web address]  Expires: 2018  1:21 PM     Your access code will  in 90 days. If you need help or a new code, please call your [place] clinic or [phone].        Care EveryWhere ID     This is your Care EveryWhere ID. This could be used by other organizations to access your [place] medical records  [id number]        Your Vitals Were     Pulse Temperature Height Pulse Oximetry BMI (Body Mass Index)       100 100.2  F (37.9  C) (Tympanic) 6' 2\" (1.88 m) 96% 23.14 kg/m2        Blood Pressure from Last 3 Encounters:   18 108/69   18 (!) 65/46   17 105/69    Weight from Last 3 Encounters:   18 180 lb 3.2 oz (81.7 kg)   17 193 lb (87.5 kg)   17 191 lb 3.2 oz (86.7 kg)               Primary Care Provider Office Phone # Fax #    R Min Soto [phone] [address] [address]        Equal Access to Services     SATISH MCKENNA AH: Hadii aad ku hadasho Soomaali, waaxda luqadaha, qaybta kaalmada adeegyada, waxay idiin hayloreen heraclio mahajan. So [facility] [phone].    ATENCIÓN: Si habla español, tiene a lopez disposición servicios gratuitos de asistencia lingüística. Llame al [phone].    We comply with applicable federal civil rights laws and [place] laws. We do not discriminate on the basis of race, color, national origin, age, disability, sex, sexual orientation, or gender identity.            Thank you!     Thank you for choosing [facility]  for your care. Our goal is always to provide you with excellent care. Hearing back from our patients is one way we can continue to improve our services. Please take a few minutes to complete the written survey that you may receive in the mail after your visit with us. Thank you!             Your Updated Medication List - Protect others around you: Learn how to safely use, store and throw away your medicines at " www.disposemymeds.org.          This list is accurate as of 5/11/18  1:21 PM.  Always use your most recent med list.                   Brand Name Dispense Instructions for use Diagnosis    ASPIRIN EC PO      Take 81 mg by mouth daily        B-12 1000 MCG Caps      Take 1,000 mcg by mouth daily        CENTRUM SILVER per tablet      Take 1 tablet by mouth daily        DECADRON PO      Take 1 mg by mouth every other day        fish oil-omega-3 fatty acids 1000 MG capsule      Take 2 g by mouth daily        folic acid 1 MG tablet    FOLVITE     Take 1 mg by mouth daily.        NEW MED      Tart cherry juice  1oz daily        VITAMIN D PO      Take 1,000 Units by mouth daily

## 2018-05-12 NOTE — PLAN OF CARE
Problem: Pneumonia (Adult)  Goal: Signs and Symptoms of Listed Potential Problems Will be Absent, Minimized or Managed (Pneumonia)  Signs and symptoms of listed potential problems will be absent, minimized or managed by discharge/transition of care (reference Pneumonia (Adult) CPG).  Outcome: No Change  Noted slight increase in temperature with 2000 assessment, wanted to rest.  Earlier compliants about room temp but improved now.  Lung sounds about same aa on admission diminished and course to upper lobes but harsh cough is only intermittent.   Will continue to monitor closely.  Encouraging fluids and rest.  Continue to monitor.   Wife just arrived to visit.

## 2018-05-12 NOTE — PROGRESS NOTES
[facility] Hospitalist Service   Progress Note May 12, 2018         Impression and Plan:   1. CAP (community acquired pneumonia)- symptomatically improving today. Required 2L overnight and now back on rm air. Continue azithro and ceftriaxone. Given that this may be a secondary baterial process (parainfluenza positive), inclined to continue abxs for now.  2. Metastatic nonsmall cell lung ca- CT shows tumor vs PE. Agree with admitting provider that given presentation doubt that this is a PE and will not treat him with full anticoagulation.  3. Parainfluenza virus 3- came back pos this afternoon. Needs droplet precautions.       Prophylaxis: lovenox  Lines, catheters and restraints- piv  Code status- full code, see H&P  Disposition- 1-2 days            Interval History:     Feels a bit better today. On O2 overnight and off this morning.   Still has cough and spiked temp overnight.  Sob is improving.  No nv  Tolerating po    Exam:  Temp:  [98.2  F (36.8  C)-101.4  F (38.6  C)] 98.4  F (36.9  C)  Pulse:  [] 72  Heart Rate:  [72-98] 75  Resp:  [14-18] 16  BP: ()/(46-79) 105/63  SpO2:  [90 %-99 %] 99 %  Body mass index is 22.7 kg/(m^2).     General: awake and alert. In nad  CV: Regular rate and rhythm. No murmur noted  Lungs: mild rales throughout without wheeze. Cough present  Abd: Soft, nontender, nondistended, bowel sounds normal  Skin/Ext: Warm and dry. Peripheral edema- none         Data:   All laboratory data reviewed    ROUTINE IP LABS (Last four results)  BMP  Recent Labs  Lab 05/12/18  0456 05/11/18  1215    135   POTASSIUM 4.2 4.1   CHLORIDE 104 101   JEANETTE 7.7* 8.3*   CO2 28 28   BUN 13 17   CR 0.82 1.02   GLC 87 90     CBC  Recent Labs  Lab 05/12/18  0456 05/11/18  1215   WBC 1.7* 3.1*   RBC 3.63* 4.42   HGB 10.3* 12.5*   HCT 30.9* 37.7*   MCV 85 85   MCH 28.4 28.3   MCHC 33.3 33.2   RDW 13.7 13.9   * 150     INRNo lab results found in last 7 days.       Medications:  I have  personally reviewed the patient's current medications as documented in EPIC. Any new additions or changes are documented in the assessment and plan.          Roxanne Horton MD

## 2018-05-12 NOTE — CONSULTS
Care Transition Initial Assessment - RN  Reason For Consult: other (see comments) (Pneumonia)   Met with: Patient and spouse.    DATA   Active Problems:    Pneumonia    CAP (community acquired pneumonia)       Primary Care Clinic Name: [facility]  Primary Care MD Name:  Post  Contact information and PCP information verified: Yes    ASSESSMENT  Cognitive Status: awake, alert and oriented.       Lives With: spouse  Living Arrangements: house     Description of Support System: Supportive, Involved   Who is your support system?: Wife   Support Assessment: Adequate family and caregiver support   Insurance Concerns: No Insurance issues identified      This writer met with pt and wife, introduced self and role.  Care Transitions is consulted for a diagnosis of pneumonia and lung cancer.  The patient lives independently in the community.  He does not use home O2.  Declined the need for [facility].  Provided pneumonia education resources.  There are no discharge needs identified.  The patient agreed to enroll in Clinic Care Coordination.    As a system [facility] wants to ensure that across the care continuum that you have support through care coordination services that include nurses and social workers in the outpatient setting.  Due to your pneumonia I feel it is important you have this support when you discharge.  They will be calling you within 24-48 hours of your discharge.   A brochure describing the services was provided.  If you have questions you can reach out to your clinic directly and ask for the Care Coordinator assigned to your care.    PLAN    Home      Discharge Planner   Discharge Plans in progress: Home  Barriers to discharge plan: Medical stability  Follow up plan: Referral to Clinic Care Coordinator.       Entered by: Rebeka Duong 05/12/2018 11:41 AM           Rebeka Duong RN, Care Coordinator [phone]

## 2018-05-12 NOTE — PROGRESS NOTES
[facility] IDD called to report that pt was positive for PIV3 (parainfluenza virus 3).  Droplet and contact precautions placed on pt.

## 2018-05-12 NOTE — H&P
Admitted:     05/11/2018      CHIEF COMPLAINT:  Cough and shortness of breath, increasing over 1-2 weeks.      HISTORY OF PRESENT ILLNESS:  Juan Choudhary is a 72-year-old male with a history of a nonsmall cell adenocarcinoma of the lung who presented to the emergency room after being seen in the clinic.  He went to the clinic because of increasing cough over 1-2 weeks.  He also had increasing rhinitis.  He had chills.  In the clinic, he had a fever to 100.2 and a low blood pressure.  He was sent by car to the emergency room.  He notes that he has had yellow sputum production and some increased rhinitis, increased cough and wheezing.      He has a history of nonsmall cell lung cancer.  He had a right lower lobe lobectomy for this in 2011.  Eventually the cancer was thought to have spread to the other lung.  He saw Oncology in [place] in March 2018.  He has had both chemo and radiation done for this.  They elected to not pursue further treatment at this time since he clinically was stable; however, they did worry about disease progression.  They have not talked to him about DNR/DNI status.      In the emergency room, he had a chest x-ray and then eventually a chest CT.  There was a mass-like opacity that extended into the right main pulmonary artery.  There was a small right effusion and a focal right heterogenous lung opacity, a small pulmonary embolus versus extension of tumor process into the pulmonary artery cannot be ruled out.  It was apparently concerning for tumor.  Dense consolidation due to infection cannot be ruled out.  There was a smaller but similar area of left upper lobe opacity with multiple small nodular densities on the left, some appearing spiculated, again, very concerning for tumor with infection not excluded.  He had some new liver lesions; he had others present in the past, and it was elected to start him on antibiotics in the emergency room.      PAST MEDICAL HISTORY:  The patient has had  minimal medical problems in the past.  He had the nonsmall cell lung cancer diagnosed in 2011.  He had a right-sided lobectomy for this.  He has had radiation and chemotherapy, but he is not currently on medication.      ALLERGIES:  PENICILLIN HAS PRODUCED RASH.  SULFA, UNCLEAR.       FAMILY HISTORY:  Father with heart disease, mother with lung cancer.      HABITS:  He smoked cigarettes in the remote past.  Some alcohol use.      SOCIAL HISTORY:  He is retired.      MEDICATIONS:   1.  Aspirin 81 mg daily.   2.  Vitamin D 1000 units daily.   3.  Vitamin B12 at 1000 mcg daily.   4.  Fish oil 1 gram daily.   5.  Centrum Silver 1 daily.   6.  Folic acid 1 mg daily.      REVIEW OF SYSTEMS:  He has had chills, low-grade fever, cough, yellow sputum, some increased rhinitis, wheezing.  No chest pain, no abdominal pain, no change in bowel habits.  No urinary tract symptoms, no skin symptoms.  Rest of 10-point review of systems is negative.      PHYSICAL EXAMINATION:   VITAL SIGNS:  Temperature is 98, pulse 75, blood pressure 105/63, respiration rate 16, sat 92% on room air.   HEENT:  Ears negative.  Oral negative.   NECK:  Supple.   LUNGS:  With expiratory wheezes and some bibasilar crackles, no respiratory distress.   COR:  S1, S2 was regular, without click, rub or murmur.   ABDOMEN:  Soft, benign, nontender, without guarding, rebound, rigidity or mass.   EXTREMITIES:  Negative, no edema.  Homans' negative.   NEUROLOGIC:  Cranial nerves, motor, sensory, reflexes normal.   GENITALIA:  Normal.      LABORATORY DATA:  Blood culture sent.  CBC:  White count 3100, hemoglobin 12.5, platelet count 150,000.  Comprehensive metabolic panel normal except for calcium of 8.3.  Lactic acid was normal.  VBG:  pH 7.38, pCO2 of 48.      ASSESSMENT:   1.  Cough, low-grade fever, wheezing, in a patient with apparent metastatic nonsmall cell lung cancer.   2.  Leukopenia and anemia with normal MCV.   3.  Rule out community-acquired pneumonia.       PLAN:  It is hard to tell if the patient's symptoms are related to advancing cancer or pneumonia or both.  I think pulmonary embolus would be very unlikely, given the history of fever, the yellow sputum production, the relatively slow onset of symptoms, and wheezing.  Technically, he does not HCAP, so we will treat with Rocephin and Zithromax.  I asked him about his resuscitation status.  He said he was not sure what he wanted, so I told him that he would remain full code unless he told us otherwise.  I sent a procalcitonin.  At this point, I opted not to assume that there was a pulmonary embolus, and I did not treat him with full anticoagulation.  We will use Lovenox for prophylaxis.         ARELY CASTILLO MD             D: 2018   T: 2018   MT: BHARATI      Name:     RUTHANN SUAZO   MRN:      [id number]        Account:      [id number]   :      1945        Admitted:     2018                   Document: [id number]

## 2018-05-12 NOTE — DISCHARGE INSTRUCTIONS
As a system [facility] wants to ensure that across the care continuum that you have support through care coordination services that include nurses and social workers in the outpatient setting.  Due to your pneumonia I feel it is important you have this support when you discharge.  They will be calling you within 24-48 hours of your discharge. A brochure describing the services was provided.  If you have questions you can reach out to your clinic directly and ask for the Care Coordinator assigned to your care.  Rebeka Duong RN, Care Coordinator [phone]

## 2018-05-12 NOTE — PLAN OF CARE
Problem: Pneumonia (Adult)  Goal: Signs and Symptoms of Listed Potential Problems Will be Absent, Minimized or Managed (Pneumonia)  Signs and symptoms of listed potential problems will be absent, minimized or managed by discharge/transition of care (reference Pneumonia (Adult) CPG).   Outcome: Improving  Pt is on room air, doing well, denies SOB, spot check of 97% sat.  Denies pain or discomfort, says he feels better than yesterday.  WBC low, lactic WNL.

## 2018-05-12 NOTE — PROGRESS NOTES
Did review with patient reason for droplet precautions and will be on droplet per protocal for puenmonia diagnosis.

## 2018-05-12 NOTE — PLAN OF CARE
Problem: Pneumonia (Adult)  Goal: Signs and Symptoms of Listed Potential Problems Will be Absent, Minimized or Managed (Pneumonia)  Signs and symptoms of listed potential problems will be absent, minimized or managed by discharge/transition of care (reference Pneumonia (Adult) CPG).   Temperature 100.2 oral.  Oxygen saturation 90% on room air at 2350.  Oxygen started at 2 liters per minute per nasal cannula.  Oxygen saturation increased to 95%.

## 2018-05-12 NOTE — H&P
Admitted:     05/11/2018      CHIEF COMPLAINT:  Increasing shortness of breath and cough over 2 weeks.      HISTORY OF PRESENT ILLNESS:  Juan Choudhary is a 72-year-old male who has been relatively healthy except for the diagnosis of a non-small cell carcinoma of the right lower lung in 2011.  He had a right lower lobe lobectomy and was eventually treated with Taxotere chemotherapy.  He had a recurrence of disease bilaterally.  He went on to have radiation treatment to his right-sided lesion in 2017.  He has been off of treatment most recently.  He saw his oncology doctors in [place] in March.  They noted that he remained stable, but they were worried about progressive disease.  They did not recommend treatment at that time, but suggested a 3-month follow-up.      The patient began to have a cough 1-2 weeks ago.  He has had a lot of nasal congestion, although some of it has been chronic.  His sputum has become yellow.  He has had chills.  He had a temperature reading of 100.2 in the clinic, and he had hypotension noted there with a blood pressure of 65/46.  He was sent over to the ER by car.      In the ER, his blood pressure was in the low normal range.  His temperature has been normal.  A chest x-ray showed increased right pleural effusion and increased right basilar opacity consistent with infiltrate or atelectasis.  There was a new opacity in the left hilar region.  He went on to have a chest CT that showed a small right effusion and focal right lung opacity that extended to the right hilum and possibly into the right pulmonary artery.  Radiologist was not able to exclude a small pulmonary embolus versus extension of this process into the pulmonary artery; was concerning for tumor.  He could not exclude dense consolidation due to infection.  There was a smaller but similar area in the left upper lobe opacity with multiple small nodular densities on the left, some appearing spiculated concerning for tumor with  infection not excluded.  There were a few new liver lesions; others were present in the past.      Given his history, it was felt that it was more consistent with progression of tumor or with infection or both.  The onset of symptoms was somewhat subacute and it was associated with some nasal symptoms and yellow sputum, so I think a pulmonary embolus is unlikely.  He was given intravenous antibiotics and admitted.      PAST MEDICAL HISTORY:   1.  Non-small cell adenocarcinoma diagnosed in 2011, treated with a right lower lobe lobectomy followed by chemo and then radiation.  He is currently not receiving treatment.  There is evidence of bilateral lung disease at this time.   2.  Esophageal reflux.   3.  Hemorrhoids.      MEDICATIONS:   1.  Aspirin 81 mg daily.   2.  Vitamin D 1000 units daily.   3.  B12 1000 mcg daily.   4.  Fish oil 2 grams daily.   5.  Folic acid 1 mg daily.   6.  Multivite daily.      ALLERGIES:  PENICILLIN - RASH.  CEPHALOSPORINS ARE OKAY.  SULFA DRUGS, REACTION NOT LISTED.      SOCIAL HISTORY:  He is retired.  He was an .  He has 4 kids.  He is .  His wife is in the ER with him.      PAST SURGICAL HISTORY:  Includes vasectomy, bronchoscopies, right lower lobe lobectomy.      HABITS:  He smoked just a bit as a kid, but not after that.  Occasional alcohol.      REVIEW OF SYSTEMS:  He has had chills, low-grade temperature elevation at the clinic today.  He has had rhinitis, worse recently.  He has a cough, yellow sputum production.  He has been anorexic.  He has had decreased p.o. intake recently.  He has shortness of breath and cough.  No abdominal symptoms.  No urinary tract symptoms.  No skin symptoms.  No neurological symptoms.  The rest of the 10-point review of systems is negative.      FAMILY HISTORY:  His mother had lung cancer.  Father and sister with heart disease, brother with prostate cancer.      PHYSICAL EXAMINATION:   GENERAL:  He is alert, somewhat pale,  nondistressed.   VITAL SIGNS:  Temperature is 98, heart rate 96, blood pressure 108/69, respiratory rate 16, sat 95% on room air.   HEENT:  Ears: Negative.  Oral: Negative.  Eyes:  Pupils round, react to light.   NECK:  Supple.  There is no mass.   LUNGS:  He has coarse crackles at the right base, a lesser degree of crackles at the left base.  He has expiratory wheezes, but no respiratory distress.   CARDIOVASCULAR:  S1, S2, without click, rub, or murmur.   ABDOMEN:  Flat, soft, benign, nontender, without guarding, rebound, rigidity or mass.   GENITALIA:  Grossly normal.   EXTREMITIES:  Without edema.   NEUROLOGIC:  Cranial nerves, motor, reflexes all within normal limits.      LABORATORY DATA:  Chest CT as above.  Procalcitonin pending.  Sputum culture ordered.  CBC:  White count 3.1, hemoglobin 12.5, platelet count 150,000.  Comprehensive metabolic panel notable for calcium of 8.3.  Respiratory virus panel ordered.  Blood culture pending.      ASSESSMENT:     1.  Cough and shortness of breath in a patient with a metastatic non-small cell lung cancer, rule out pneumonia, rule out extension of cancer.  Doubt pulmonary embolus.   2.  Leukopenia, which has been chronic to some degree.   3.  Anemia with normal MCV.        PLAN:  Symptoms have been coming on over 2 weeks.  They are associated with cough and yellow sputum, chills, and some rhinitis.  I suspect there is infection involved.  This also may involve extension of cancer.  I talked to him about resuscitation status.  He had not been asked about this before.  He really was not sure what he wanted, so I told him that he would be full code.  I asked him to think about it further.  I told him that we would go ahead and treat for pneumonia.  At this point, he would be in the community pneumonia acquired category and not strictly in the healthcare-acquired pneumonia category.  Therefore, he will be started with Zithromax and Rocephin.  I would have a low threshold  for changing the antibiotics if he had clinical decompensation.        We will obtain a procalcitonin to try to get some indication of the degree of infection.  I told him that this could simply be due to cancer progression.  He seems to understand that.  A pulmonary embolus would be unlikely given the slow wind-up of symptoms and the nasal end symptoms and his phlegm.  Also, he is wheezing.  We will go ahead and treat that with DuoNebs and p.r.n. albuterol nebs.  He will likely be in the hospital greater than 2 nights.         ARELY CASTILLO MD             D: 2018   T: 2018   MT: NTS      Name:     RUTHANN SUAZO   MRN:      [id number]        Account:      [id number]   :      1945        Admitted:     2018                   Document: [id number]

## 2018-05-12 NOTE — PLAN OF CARE
Problem: Pneumonia (Adult)  Goal: Signs and Symptoms of Listed Potential Problems Will be Absent, Minimized or Managed (Pneumonia)  Signs and symptoms of listed potential problems will be absent, minimized or managed by discharge/transition of care (reference Pneumonia (Adult) CPG).   Walked to bathroom at 0200 and had BM.  Stated he did not get short of breathe when walking to and from bathroom.

## 2018-05-13 NOTE — PLAN OF CARE
Problem: Pneumonia (Adult)  Goal: Signs and Symptoms of Listed Potential Problems Will be Absent, Minimized or Managed (Pneumonia)  Signs and symptoms of listed potential problems will be absent, minimized or managed by discharge/transition of care (reference Pneumonia (Adult) CPG).   Occasional productive cough.  Denies dyspnea.  Denies pain.  Oxygen saturation 94% on room air.

## 2018-05-13 NOTE — PROGRESS NOTES
[facility] DISCHARGE NOTE    Patient discharged to home at 2:18 PM via wheel chair. Accompanied by spouse and staff. Discharge instructions reviewed with patient, opportunity offered to ask questions. Prescriptions filled and sent with patient upon discharge. All belongings sent with patient.    Luzma Dueñas

## 2018-05-13 NOTE — PHARMACY - DISCHARGE MEDICATION RECONCILIATION
Discharge medication review for this patient is complete. Pharmacist assisted with medication reconciliation of discharge medications with prior to admission medications.     The following changes were made to the discharge medication list based on pharmacist review:  Added:  none  Discontinued: none  Changed: none      Patient's Discharge Medication List  - medications as listed on After Visit Summary (AVS)     Review of your medicines      START taking       Dose / Directions    acidophilus tablet   Used for:  Diarrhea, unspecified type        Dose:  1 tablet   Take 1 tablet by mouth daily   Quantity:  30 tablet   Refills:  0       azithromycin 250 MG tablet   Commonly known as:  ZITHROMAX   Used for:  Pneumonia of both lungs due to infectious organism, unspecified part of lung        Dose:  250 mg   Start taking on:  5/14/2018   Take 1 tablet (250 mg) by mouth daily for 1 day TAKE 1st tablet on Monday, May 14th.   Quantity:  2 tablet   Refills:  0         CONTINUE these medicines which have NOT CHANGED       Dose / Directions    ASPIRIN EC PO        Dose:  81 mg   Take 81 mg by mouth daily   Refills:  0       B-12 1000 MCG Caps        Dose:  1000 mcg   Take 1,000 mcg by mouth daily   Refills:  0       CENTRUM SILVER per tablet        Dose:  1 tablet   Take 1 tablet by mouth daily (with lunch)   Refills:  0       NEW MED        Dose:  1 oz   Take 1 oz by mouth daily (with lunch) Tart cherry juice  - mixed with water   Refills:  0       VITAMIN D PO        Dose:  1000 Units   Take 1,000 Units by mouth daily   Refills:  0         STOP taking          fish oil-omega-3 fatty acids 1000 MG capsule           folic acid 1 MG tablet   Commonly known as:  FOLVITE                Where to get your medicines      These medications were sent to [facility] [address]  [address] [address]     Phone:  [phone]      acidophilus tablet     azithromycin 250 MG tablet

## 2018-05-13 NOTE — PROGRESS NOTES
Transition Communication Hand-off for Care Transitions to Next Level of Care Provider/Discharge:    Name: Juan Choudhary  : 1945  MRN #: [id number]  Primary Care Provider: GIRISH Soto  Primary Care MD Name: Dr. Soto  Primary Clinic: [address] [facility] [address]  Primary Care Clinic Name: [facility]  Reason for Hospitalization:  Adenocarcinoma of lung, right (H) [C34.91]  Pneumonia of both lungs due to infectious organism, unspecified part of lung [J18.9]  Admit Date/Time: 2018 11:22 AM  Discharge Date: 18  Payor Source: Payor: BCBS / Plan: BCBS PLATINUM BLUE / Product Type: PPO /     Readmission Assessment Measure (JULIO CESAR) Risk Score/category: Low        Reason for Communication Hand-off Referral: Admission diagnoses: PN    Discharge Plan:  Home       Concern for non-adherence with plan of care:   Y/N No            Key Recommendations:  Care Transitions was consulted for a diagnosis of pneumonia and lung cancer.  The patient lives independently in the community. He does not use home O2.  Declined the need for [facility].  Provided pneumonia education resources.  There are no discharge needs identified.  The patient agreed to enroll in Clinic Care Coordination.        Rebeka Duong RN, Care Coordinator

## 2018-05-13 NOTE — PROGRESS NOTES
[facility]   Progress Note May 12, 2018         Impression and Plan:   1. CAP (community acquired pneumonia)- symptomatically improving. Off O2. Was started on azithro and ceftriaxone. The viral swab is pos for parainfluenza virus 3 which may actually be the sole cause of his sxs, especailly given a normal procalcitonin. Given, however, his underlying cancer/recent chemo/low wbc, will elect to conmplete azithro course. Awaiting morning labs  2. Metastatic nonsmall cell lung ca- CT shows tumor vs PE. Doubt that this is a PE given history of progress productive cough with fever and known lung ca and will not treat him with full anticoagulation.  3. Parainfluenza virus 3- Needs droplet precautions. Symptomatic cares. See #1  4. Diarrhea- having watery stools now this morning, likely 2/2 abxs. Narrow abx to just azithro. RN assessment consistent with abx reaction rather than cdiff appearance. Will start probiotic and give none dose of pepto.   D/c folic acid and fish oil since pt does not take.     Prophylaxis: lovenox  Lines, catheters and restraints- piv  Code status- full code, see H&P  Disposition- later this afternoon if diarrhea does not clinically worsen.            Interval History:     Feels a bit better today. Off O2  Still has cough but afebrile  Sob is improving.  No nv  Tolerating po    Exam:  Temp:  [97.8  F (36.6  C)-99.3  F (37.4  C)] 98.2  F (36.8  C)  Pulse:  [76-88] 76  Heart Rate:  [70-88] 70  Resp:  [16] 16  BP: ()/(60-80) 109/72  SpO2:  [94 %-97 %] 97 %  Body mass index is 22.08 kg/(m^2).     General: awake and alert. In nad  CV: Regular rate and rhythm. No murmur noted  Lungs: mild rales throughout without wheeze. Cough present  Abd: Soft, nontender, nondistended, bowel sounds normal  Skin/Ext: Warm and dry. Peripheral edema- none         Data:   All laboratory data reviewed    ROUTINE IP LABS (Last four results)  BMP    Recent Labs  Lab 05/12/18  8046 05/11/18  1215     135   POTASSIUM 4.2 4.1   CHLORIDE 104 101   JEANETTE 7.7* 8.3*   CO2 28 28   BUN 13 17   CR 0.82 1.02   GLC 87 90     CBC    Recent Labs  Lab 05/12/18  0456 05/11/18  1215   WBC 1.7* 3.1*   RBC 3.63* 4.42   HGB 10.3* 12.5*   HCT 30.9* 37.7*   MCV 85 85   MCH 28.4 28.3   MCHC 33.3 33.2   RDW 13.7 13.9   * 150     INRNo lab results found in last 7 days.       Medications:  I have personally reviewed the patient's current medications as documented in EPIC. Any new additions or changes are documented in the assessment and plan.          Roxanne Horton MD

## 2018-05-13 NOTE — DISCHARGE SUMMARY
[facility] Discharge Summary    Juan Choudhary MRN# [id number]   Age: 73 year old YOB: 1945     Date of Admission:  5/11/2018  Date of Discharge::  5/13/2018  Admitting Physician:  Parker Osuna MD  Discharge Physician:  Roxanne Horton      Primary care provider: GIRISH Soto          Admission Diagnoses:   Adenocarcinoma of lung, right (H) [C34.91]  Pneumonia of both lungs due to infectious organism, unspecified part of lung [J18.9]          Discharge Diagnosis:   Active Problems:    Pneumonia    CAP (community acquired pneumonia)            Procedures:     none          Medications Prior to Admission:     Prescriptions Prior to Admission   Medication Sig Dispense Refill Last Dose     ASPIRIN EC PO Take 81 mg by mouth daily   5/9/2018 at 1200     Cholecalciferol (VITAMIN D PO) Take 1,000 Units by mouth daily    5/9/2018 at 1200     Cyanocobalamin (B-12) 1000 MCG CAPS Take 1,000 mcg by mouth daily   5/9/2018 at 1200     Multiple Vitamins-Minerals (CENTRUM SILVER) per tablet Take 1 tablet by mouth daily (with lunch)    5/9/2018 at 1200     NEW MED Take 1 oz by mouth daily (with lunch) Tart cherry juice  - mixed with water   5/9/2018 at pm     [DISCONTINUED] fish oil-omega-3 fatty acids 1000 MG capsule Take 1 g by mouth daily (with lunch)    5/9/2018 at 1200     [DISCONTINUED] folic acid (FOLVITE) 1 MG tablet Take 1 mg by mouth daily.   dc-not taking             Discharge Medications:     Current Discharge Medication List      START taking these medications    Details   azithromycin (ZITHROMAX) 250 MG tablet Take 1 tablet (250 mg) by mouth daily for 1 day TAKE 1st tablet on Monday, May 14th.  Qty: 2 tablet, Refills: 0    Associated Diagnoses: Pneumonia of both lungs due to infectious organism, unspecified part of lung; Community acquired pneumonia, unspecified laterality      Lactobacillus (ACIDOPHILUS) tablet Take 1 tablet by mouth daily  Qty: 30 tablet, Refills: 0     Associated Diagnoses: Diarrhea, unspecified type         CONTINUE these medications which have NOT CHANGED    Details   ASPIRIN EC PO Take 81 mg by mouth daily      Cholecalciferol (VITAMIN D PO) Take 1,000 Units by mouth daily       Cyanocobalamin (B-12) 1000 MCG CAPS Take 1,000 mcg by mouth daily      Multiple Vitamins-Minerals (CENTRUM SILVER) per tablet Take 1 tablet by mouth daily (with lunch)       NEW MED Take 1 oz by mouth daily (with lunch) Tart cherry juice  - mixed with water         STOP taking these medications       fish oil-omega-3 fatty acids 1000 MG capsule Comments:   Reason for Stopping:         folic acid (FOLVITE) 1 MG tablet Comments:   Reason for Stopping:                     Consultations:   No consultations were requested during this admission          Brief History of Illness:     Admission HPI:  Juan Choudhary is a 72-year-old male who has been relatively healthy except for the diagnosis of a non-small cell carcinoma of the right lower lung in 2011.  He had a right lower lobe lobectomy and was eventually treated with Taxotere chemotherapy.  He had a recurrence of disease bilaterally.  He went on to have radiation treatment to his right-sided lesion in 2017.  He has been off of treatment most recently.  He saw his oncology doctors in [place] in March.  They noted that he remained stable, but they were worried about progressive disease.  They did not recommend treatment at that time, but suggested a 3-month follow-up.       The patient began to have a cough 1-2 weeks ago.  He has had a lot of nasal congestion, although some of it has been chronic.  His sputum has become yellow.  He has had chills.  He had a temperature reading of 100.2 in the clinic, and he had hypotension noted there with a blood pressure of 65/46.  He was sent over to the ER by car.       In the ER, his blood pressure was in the low normal range.  His temperature has been normal.  A chest x-ray showed increased right  pleural effusion and increased right basilar opacity consistent with infiltrate or atelectasis.  There was a new opacity in the left hilar region.  He went on to have a chest CT that showed a small right effusion and focal right lung opacity that extended to the right hilum and possibly into the right pulmonary artery.  Radiologist was not able to exclude a small pulmonary embolus versus extension of this process into the pulmonary artery; was concerning for tumor.  He could not exclude dense consolidation due to infection.  There was a smaller but similar area in the left upper lobe opacity with multiple small nodular densities on the left, some appearing spiculated concerning for tumor with infection not excluded.  There were a few new liver lesions; others were present in the past.       Given his history, it was felt that it was more consistent with progression of tumor or with infection or both.  The onset of symptoms was somewhat subacute and it was associated with some nasal symptoms and yellow sputum, so I think a pulmonary embolus is unlikely.  He was given intravenous antibiotics and admitted.           Hospital Course:     1. CAP (community acquired pneumonia)- symptomatically improved within 1 1/2 days. Weaned off O2. Was started initially started on azithro and ceftriaxone. The viral swab is pos for parainfluenza virus 3 which may actually be the sole cause of his sxs, especailly given a normal procalcitonin. Given, however, his underlying cancer/recent chemo/low wbc, will elect to complete azithro course.  2. Metastatic nonsmall cell lung ca- CT shows tumor vs PE. Doubt that this is a PE given history of progress productive cough with fever and known lung ca and will not treat him with full anticoagulation.  3. Diarrhea- having watery stools morning of discharge, likely 2/2 abxs. Narrow abx to just azithro, given probiotic and one dose of pepto with rec to follow up with pcp.      D/c folic acid and  fish oil since pt does not take.          DAY OF DISCHARGE SUBJECTIVE AND EXAM:   See progress note         Discharge Instructions and Follow-Up:   Discharge diet: Regular   Discharge activity: Activity as tolerated   Discharge follow-up: Follow up with primary care provider within 7 days     Additional discharge instruction: None         Discharge Disposition:   Discharged to home      Attestation:  I have reviewed today's vital signs, notes, medications, labs and imaging.  Amount of time performed on this discharge summary: 25 minutes.      Roxanne Horton MD

## 2018-05-14 NOTE — LETTER
Health Care Home - Access Care Plan    About Me  Patient Name:  Juan Choudhary    YOB: 1945  Age:                             73 year old   [place] MRN:            [id number] Telephone Information:     Home Phone [phone]   Mobile Not on file.       Address:    [address] [place] [id number] Email address:  No e-mail address on record      Emergency Contact(s)  Name Relationship Lgl Grd Work Phone Home Phone Mobile Phone   1. JAVI CHOUDHARY A* Spouse   [phone]    2. ANG MATTHEWS Daughter   [phone]              Health Maintenance: Routine Health maintenance Reviewed: Due/Overdue     My Access Plan  Medical Emergency 911   Questions or concerns during clinic hours Primary Clinic Line, I will call the clinic directly: [facility] - [phone]   24 Hour Appointment Line [phone] or  [phone] (toll free)   24 Hour Nurse Line [phone] (toll free)   Questions or concerns outside clinic hours 24 Hour Appointment Line, I will call the after-hours on-call line:   [facility] [phone] or [phone]) (toll-free)      Preferred Urgent Care     Preferred Hospital [place], [place]  [phone]   Preferred Pharmacy [id number] - [place], [place] - [address]     Behavioral Health Crisis Line The National Suicide Prevention Lifeline at 1-429.398.6638 or 081     My Care Team Members  Patient Care Team       Relationship Specialty Notifications Start End    Post, GIRISH Copeland MD PCP - General Family Practice  5/13/11     Phone: [phone] Fax: [phone]         [address] [facility] [address]    Jayesh Wick, ASAEL Clinic Care Coordinator Primary Care - CC Admissions 5/14/18     Phone: [phone] Fax: [phone]               My Medical and Care Information  Problem List   Patient Active Problem List   Diagnosis     ESOPHAGEAL REFLUX     Internal bleeding  hemorrhoids     CARDIOVASCULAR SCREENING; LDL GOAL LESS THAN 160     Advanced directives, counseling/discussion     Health Care Home     Malignant neoplasm of lower lobe of right lung (H)     Right lower lobe pneumonia (H)     Lung cancer, lower lobe (H)     Adenocarcinoma of lung, right (H)     Pneumonia     CAP (community acquired pneumonia)

## 2018-05-14 NOTE — PROGRESS NOTES
Transition Communication Hand-off for Care Transitions to Next Level of Care Provider     Name: Juan Choudhary  : 1945  MRN #: [id number]  Primary Care Provider: GIRISH Soto  Primary Care MD Name: Dr. Soto  Primary Clinic: [address] [facility] [address]  Primary Care Clinic Name: [facility]  Reason for Hospitalization:  Adenocarcinoma of lung, right (H) [C34.91]  Pneumonia of both lungs due to infectious organism, unspecified part of lung [J18.9]  Admit Date/Time: 2018 11:22 AM  Discharge Date: 18  Payor Source: Payor: BCBS / Plan: BCBS PLATINUM BLUE / Product Type: PPO /      Readmission Assessment Measure (JULIO CESAR) Risk Score/category: Low      Reason for Communication Hand-off Referral: Admission diagnoses: PN     Discharge Plan:  Home         Concern for non-adherence with plan of care:                           Y/N No             Key Recommendations:  Care Transitions was consulted for a diagnosis of pneumonia and lung cancer.  The patient lives independently in the community. He does not use home O2.  Declined the need for Lifeline.  Provided pneumonia education resources.  There are no discharge needs identified.  The patient agreed to enroll in Clinic Care Coordination.           Rebeka Duong RN, Care Coordinator

## 2018-05-14 NOTE — LETTER
[place] CARE COORDINATION    May 14, 2018    Juan Choudhary  [address] [address] [place] [address]      Dear Juan,    I am a clinic care coordinator who works with GIRISH Stoo MD at [facility]. I wanted to thank you for spending the time to talk with me.  I wanted to introduce myself and provide you with my contact information so that you can call me with questions or concerns about your health care. Below is a description of clinic care coordination and how I can further assist you.     The clinic care coordinator is a registered nurse and/or  who understand the health care system. The goal of clinic care coordination is to help you manage your health and improve access to the [facility] system in the most efficient manner. The registered nurse can assist you in meeting your health care goals by providing education, coordinating services, and strengthening the communication among your providers. The  can assist you with financial, behavioral, psychosocial, chemical dependency, counseling, and/or psychiatric resources.    Please feel free to contact me at [phone], with any questions or concerns. We at [facility] are focused on providing you with the highest-quality healthcare experience possible and that all starts with you.     Sincerely,     Jayesh Ferrera, MSN, RN, PHN I Clinic Care Coordinator  [facility]  Phone: [phone]  [email or web address]      Enclosed: I have enclosed a copy of a 24 Hour Access Plan. This has helpful phone numbers for you to call when needed. Please keep this in an easy to access place to use as needed.

## 2018-05-14 NOTE — PROGRESS NOTES
Clinic Care Coordination Contact    Clinic Care Coordination Contact  OUTREACH    Referral Information:  Referral Source: IP Handoff    Primary Diagnosis: Respiratory Disorders - other    Chief Complaint   Patient presents with     Clinic Care Coordination - [facility]     RN CC     Care Team     Chart Review Please        Universal Utilization:   Clinic Utilization  Difficulty keeping appointments:: No  Utilization    Last refreshed: 5/14/2018  7:07 AM:  No Show Count (past year) 0       Last refreshed: 5/14/2018  7:07 AM:  ED visits 0       Last refreshed: 5/14/2018  7:07 AM:  Hospital admissions 1          Current as of: 5/14/2018  7:07 AM             Clinical Concerns:  Current Medical Concerns:  The patient has a history of lung cancer and was recently hospitalized with CAP.  The patient is feeling better, with a residual productive cough remaining.  The patient agrees to care coordination and a follow up call in 2 weeks.  Pneumonia   CAP (community acquired pneumonia)   Adenocarcinoma of lung, right (H)   Malignant neoplasm of lower lobe of right lung (H)   Right lower lobe pneumonia (H)   Lung cancer, lower lobe (H)   Health Care Home   Advanced directives, counseling/discussion   CARDIOVASCULAR SCREENING; LDL GOAL LESS THAN 160   Internal bleeding hemorrhoids   ESOPHAGEAL REFLUX       Current Behavioral Concerns: none noted    Education Provided to patient: The offerings of care coordination was discussed with the patient.     Pain  Chronic pain (GOAL):: No  Health Maintenance Reviewed: Due/Overdue   Clinical Pathway: None    Medication Management:  Patient is knowledgeable on medications and is adherent.  No financial concerns reported at this time.       Functional Status:  Bed or wheelchair confined:: No  Mobility Status: Independent  Fallen 2 or more times in the past year?: Yes  Any fall with injury in the past year?: No    Living Situation:  Current living arrangement:: I live in a private home with  spouse  [facility]    Diet/Exercise/Sleep:  Diet:: Regular  Inadequate nutrition (GOAL):: No  Food Insecurity: No  Tube Feeding: No  Exercise:: Yes  How intense was your typical exercise? : Light (like stretching or slow walking)  Inadequate activity/exercise (GOAL):: No  Significant changes in sleep pattern (GOAL): No    Transportation:  Transportation concerns (GOAL):: No  Transportation means:: Regular car     Psychosocial:  Uatsdin or spiritual beliefs that impact treatment:: No  Mental health DX:: No  Mental health management concern (GOAL):: No  Informal Support system:: Spouse, Family     Financial/Insurance:   Financial/Insurance concerns (GOAL):: No       Resources and Interventions:  Current Resources:    ;         Equipment Currently Used at Home: none    Advance Care Plan/Directive  Advanced Care Plans/Directives on file:: No  Advanced Care Plan/Directive Status: Not Applicable      Patient/Caregiver understanding: The patient has a good understanding of the disease process.  Very concerned about getting pneumonia again.      Outreach Frequency: 2 weeks  Future Appointments              In 3 days Post, GIRISH Copeland MD [facility]          Plan: 1).  The patient will monitor all symptoms and contact the PCP with any changes that are noted.   2).  The patient will take all medications as prescribed by the provider.  3).  The Primary Care Coordination RN will make a follow up call to the patient again in 2 weeks.  4).  Care Coordination to remain available for the patient to contact in the event of future needs.        Jayesh Ferrera, MSN, RN, PHN I Clinic Care Coordinator  [facility]  Phone: [phone]  [email or web address]

## 2018-05-17 NOTE — PROGRESS NOTES
SUBJECTIVE:   Juan Choudhary is a 73 year old male who presents to clinic today for the following health issues:          Hospital Follow-up Visit:    Hospital/Nursing Home/IP Rehab Facility: [facility]  Date of Admission: 5/11/2018  Date of Discharge: 5/13/2018  Reason(s) for Admission: pneumonia            Problems taking medications regularly:  None       Medication changes since discharge: None, he has completed the course of azithromycin       Problems adhering to non-medication therapy:  None    Summary of hospitalization:  [facility] discharge summary reviewed  Diagnostic Tests/Treatments reviewed.  Follow up needed: Will need a follow-up chest x-ray but it is too early now to do this  Other Healthcare Providers Involved in Patient s Care:         Specialist appointment - Oncologist, to be determined  Update since discharge: improved.  Additional issues: He continues to have somewhat loose liquid stools, but not cramping or urgency.  His blood pressure still runs low but he is not lightheaded like he was he was admitted to the hospital     Post Discharge Medication Reconciliation: discharge medications reconciled, continue medications without change.  Plan of care communicated with patient     Coding guidelines for this visit:  Type of Medical   Decision Making Face-to-Face Visit       within 7 Days of discharge Face-to-Face Visit        within 14 days of discharge   Moderate Complexity [id number]   High Complexity [id number]                  Problem list and histories reviewed & adjusted, as indicated.  Additional history: none        Reviewed and updated as needed this visit by clinical staff  Tobacco  Allergies  Med Hx  Surg Hx  Fam Hx  Soc Hx      Reviewed and updated as needed this visit by Provider             ROS:  Constitutional, HEENT, cardiovascular, pulmonary, gi and gu systems are negative, except as otherwise noted.        OBJECTIVE:                                     "                BP (!) 84/60  Pulse 102  Temp 97.1  F (36.2  C) (Tympanic)  Resp 20  Ht 6' 2\" (1.88 m)  Wt 175 lb 8 oz (79.6 kg)  SpO2 95%  BMI 22.53 kg/m2    GENERAL: pale and thin but appears stronger and less distressed than when I saw him before admission to the hospital  EYES: Eyes grossly normal to inspection, extraocular movements - intact, and PERRL  NECK: no tenderness, no adenopathy, no asymmetry, no masses, no stiffness; thyroid- normal to palpation  RESP: Somewhat decreased breath sounds in the right posterior lung with a few mild squeaky wheezes noted  CV: regular rates and rhythm, normal S1 S2, no S3 or S4 and no murmur, no click or rub -  MS: extremities- no gross deformities noted, no edema         ASSESSMENT/PLAN:                                                    ASSESSMENT:  1. Community acquired pneumonia, unspecified laterality    2. Adenocarcinoma of lung, right (H)    3. Antibiotic-associated diarrhea      It was felt that this was probably parainfluenza virus with the minimal response to antibiotics and this came positive on screening.  There is also concerned that a lot of the density on his x-ray may be the advancement of his lung cancer    PLAN:    I will fax a copy of his discharge summary from the hospital to his oncologist so he is aware of what is going on    Reassured that the diarrhea should resolve but will try some Imodium A/Ain the meantime. He is already taking a probiotic      Patient Instructions   Try Imodium A/D for the loose stools. The diarrhea should resolve eventually.    I would encourage you to fill out an Advance Directive.    You should have a repeat chest xray in a month, either with me or the oncologist      GIRISH Copeland Post  [facility] [place]   "

## 2018-05-17 NOTE — MR AVS SNAPSHOT
"              After Visit Summary   5/17/2018    Juan Choudhary    MRN: [id number]           Patient Information     Date Of Birth          1945        Visit Information        Provider Department      5/17/2018 8:40 AM GIRISH Soto MD [facility]        Care Instructions    Try Imodium A/D for the loose stools. The diarrhea should resolve eventually.    I would encourage you to fill out an Advance Directive.    You should have a repeat chest xray in a month, either with me or the oncologist          Follow-ups after your visit        Who to contact     If you have questions or need follow up information about today's clinic visit or your schedule please contact [facility] directly at [phone].  Normal or non-critical lab and imaging results will be communicated to you by [employer], letter or phone within 4 business days after the clinic has received the results. If you do not hear from us within 7 days, please contact the clinic through [employer] or phone. If you have a critical or abnormal lab result, we will notify you by phone as soon as possible.  Submit refill requests through [employer] or call your pharmacy and they will forward the refill request to us. Please allow 3 business days for your refill to be completed.          Additional Information About Your Visit        MyChart Information     [employer] lets you send messages to your doctor, view your test results, renew your prescriptions, schedule appointments and more. To sign up, go to [email or web address] . Click on \"Log in\" on the left side of the screen, which will take you to the Welcome page. Then click on \"Sign up Now\" on the right side of the page.     You will be asked to enter the access code listed below, as well as some personal information. Please follow the directions to create your username and password.     Your access code is: [email or web address]  Expires: 8/9/2018  1:21 PM     Your access code will " " in 90 days. If you need help or a new code, please call your [place] clinic or [phone].        Care EveryWhere ID     This is your Care EveryWhere ID. This could be used by other organizations to access your [place] medical records  [id number]        Your Vitals Were     Pulse Temperature Respirations Height Pulse Oximetry BMI (Body Mass Index)    102 97.1  F (36.2  C) (Tympanic) 20 6' 2\" (1.88 m) 95% 22.53 kg/m2       Blood Pressure from Last 3 Encounters:   18 (!) 84/60   18 109/72   18 (!) 65/46    Weight from Last 3 Encounters:   18 175 lb 8 oz (79.6 kg)   18 171 lb 15.3 oz (78 kg)   18 180 lb 3.2 oz (81.7 kg)              Today, you had the following     No orders found for display       Primary Care Provider Office Phone # Fax #    R Min Soto [phone] [address] [facility] [address]        Equal Access to Services     [facility] AH: Hadii marielle mckeon hadasho Soana, waaxda luqadaha, qaybta kaalmada christiano, mony estrada . So [facility] [phone].    ATENCIÓN: Si habla español, tiene a lopez disposición servicios gratuitos de asistencia lingüística. Llame al [phone].    We comply with applicable federal civil rights laws and [place] laws. We do not discriminate on the basis of race, color, national origin, age, disability, sex, sexual orientation, or gender identity.            Thank you!     Thank you for choosing [facility]  for your care. Our goal is always to provide you with excellent care. Hearing back from our patients is one way we can continue to improve our services. Please take a few minutes to complete the written survey that you may receive in the mail after your visit with us. Thank you!             Your Updated Medication List - Protect others around you: Learn how to safely use, store and throw away your medicines at www.Dayakemymeds.org.          This list " is accurate as of 5/17/18  9:23 AM.  Always use your most recent med list.                   Brand Name Dispense Instructions for use Diagnosis    acidophilus tablet     30 tablet    Take 1 tablet by mouth daily    Diarrhea, unspecified type       ASPIRIN EC PO      Take 81 mg by mouth daily        B-12 1000 MCG Caps      Take 1,000 mcg by mouth daily        CENTRUM SILVER per tablet      Take 1 tablet by mouth daily (with lunch)        NEW MED      Take 1 oz by mouth daily (with lunch) Tart cherry juice  - mixed with water        VITAMIN D PO      Take 1,000 Units by mouth daily

## 2018-05-17 NOTE — PATIENT INSTRUCTIONS
Try Imodium A/D for the loose stools. The diarrhea should resolve eventually.    I would encourage you to fill out an Advance Directive.    You should have a repeat chest xray in a month, either with me or the oncologist

## 2018-06-15 NOTE — PROGRESS NOTES
Clinic Care Coordination Contact  Care Team Conversations    The patient is feeling much better and breathing better.  He is working on a goal of walking to the mailbox, back to the house, and around inside the house 2 times daily.  The patient states that he gets winded at times although resting for a couple of minutes and everything is fine again.  The patient denies a fever, chest pain, SOB, or signs of infection.  Appetite is good and the patient is able to stand and make a meal without any problems.  The patient agrees to have the Primary Care Coordination RN make a follow up call again in a few weeks.    Plan:  1).  The patient will monitor for signs of infection, and/or increase in cough and cough productivity.  2).  The patient will walk to the mailbox and back then around the house to increase strength and stamina.  3).  The Primary Care Coordination RN will make a follow up call to the patient again in 3 weeks.  4).  Care Coordination to remain available for the patient to contact in the event of future needs.        Jayesh Ferrera, MSN, RN, PHN I Clinic Care Coordinator  [facility]  Phone: [phone]  [email or web address]

## 2018-06-15 NOTE — LETTER
[facility] Home  Complex Care Plan  About Me  Patient Name:  Juan Choudhary    YOB: 1945  Age:     73 year old   Haily MRN:   [id number] Telephone Information:    Home Phone [phone]   Mobile Not on file.       Address:    [address] [place] [id number] Email address:  No e-mail address on record      Emergency Contact(s)  Name Relationship Lgl Grd Work Phone Home Phone Mobile Phone   1. JAVI CHOUDHARY A* Spouse   [phone]    2. ANG MATTHEWS Daughter   [phone]            Primary language:  English     needed? No   [employer]:  [phone] op. 1  Other communication barriers:    Preferred Method of Communication:  Mail  Current living arrangement:    Mobility Status/ Medical Equipment:      Health Maintenance  Health Maintenance Reviewed:      My Access Plan  Medical Emergency 911   Primary Clinic Line [facility] - [phone]   24 Hour Appointment Line [phone] or  [phone]) (toll-free)   24 Hour Nurse Line [phone] (toll-free)   Preferred Urgent Care     Preferred Hospital     Preferred Pharmacy [id number] - [place], [place] - [address]     Behavioral Health Crisis Line The National Suicide Prevention Lifeline at 1-779.444.8363 or 912     My Care Team Members    Patient Care Team       Relationship Specialty Notifications Start End    Post, GIRISH Copeland MD PCP - General Family Practice  5/13/11     Phone: [phone] Fax: [phone] [address] [facility] [address]    Jayesh Wick, RN Lead Care Coordinator Primary Care - CC Admissions 5/14/18     Phone: [phone] Fax: [phone]                My Care Plans  Self Management and Treatment Plan  Goals and (Comments)  Goals        General    Being Active (pt-stated)     Notes - Note created  6/15/2018 12:34 PM by Jayesh Wick, RN    Goal Statement: I will walk to the mailbox, back to the house  and around inside the house 2 times a day.  Measure of Success: completes the walk 2 times a day.  Supportive Steps to Achieve: encouragement  Barriers: lung cancer  Strengths: motivated  Date to Achieve By: ongoing               Action Plans on File:                       Advance Care Plans/Directives Type:        My Medical and Care Information  Problem List   Patient Active Problem List   Diagnosis     ESOPHAGEAL REFLUX     Internal bleeding hemorrhoids     CARDIOVASCULAR SCREENING; LDL GOAL LESS THAN 160     Advanced directives, counseling/discussion     Health Care Home     Malignant neoplasm of lower lobe of right lung (H)     Right lower lobe pneumonia (H)     Lung cancer, lower lobe (H)     Adenocarcinoma of lung, right (H)     Pneumonia     CAP (community acquired pneumonia)      Current Medications and Allergies:  See printed Medication Report.    Care Coordination Start Date: 6/15/2018   Frequency of Care Coordination: 2 weeks   Form Last Updated: 06/15/2018

## 2018-07-11 NOTE — PROGRESS NOTES
Clinic Care Coordination Contact    Situation: Patient chart reviewed by care coordinator.    Background: The patient is currently being treated by oncology for lung cancer.  Pneumonia   CAP (community acquired pneumonia)   Adenocarcinoma of lung, right (H)   Malignant neoplasm of lower lobe of right lung (H)   Right lower lobe pneumonia (H)   Lung cancer, lower lobe (H)   Health Care Home   Advanced directives, counseling/discussion   CARDIOVASCULAR SCREENING; LDL GOAL LESS THAN 160   Internal bleeding hemorrhoids   ESOPHAGEAL REFLUX     Assessment: The patient has been attending the follow up appointments.  Oncology has updated the PCP on the current findings and treatments.      Plan/Recommendations: 1).   The patient will make and attend all follow up appointments with the PCP and the specialists.   2).  The patient will work on walking to increase strength and stamina.  3).  The Primary Care Coordination RN will make a follow up call to the patient again in 4 weeks.  4).  Care Coordination to remain available for the patient to contact in the event of future needs.        Jayesh Ferrera, MSN, RN, PHN I Clinic Care Coordinator  [facility]  Phone: [phone]  [email or web address]

## 2018-07-31 NOTE — PROGRESS NOTES
"  SUBJECTIVE:   Juan Choudhary is a 73 year old male who presents to clinic today for the following health issues:    Chief Complaint   Patient presents with     RECHECK     Pneumonia follow up. Patient feels like he is constantly coughing stuff up from his lungs.  He feels like he always has fluid in his chest. Has a hard time breathing when he does anything.    He was hospitalized in mid May with respiratory distress and cough which was determined to probably be a parainfluenza infection.  He did not really respond to antibiotics but slowly improved enough to be able to go back home.  However, since then he has continued to have a cough which is productive and in fact sometimes it is productive of fairly copious amounts of thick yellow sputum.  If he bends forward at the waist often will feel some phlegm at the back of his throat which he is able to cough up          Problem list and histories reviewed & adjusted, as indicated.  Additional history: none        Reviewed and updated as needed this visit by clinical staff       Reviewed and updated as needed this visit by Provider               ROS:  Constitutional, HEENT, cardiovascular, pulmonary, gi and gu systems are negative, except as otherwise noted.        OBJECTIVE:                                                    /71  Pulse 101  Temp 98  F (36.7  C) (Tympanic)  Ht 6' 2\" (1.88 m)  Wt 175 lb 12.8 oz (79.7 kg)  SpO2 (!) 89%  BMI 22.57 kg/m2    GENERAL: alert, pale and underweight  EYES: Eyes grossly normal to inspection, extraocular movements - intact, and PERRL  HENT: ear canals- normal; TMs- normal; Nose- normal; Mouth- no ulcers, no lesions  NECK: no tenderness, no adenopathy, no asymmetry, no masses, no stiffness; thyroid- normal to palpation  RESP: rales throughout and rhonchi throughout  CV: regular rates and rhythm, normal S1 S2, no S3 or S4 and no murmur, no click or rub -  MS: extremities- no gross deformities noted, no edema     "     ASSESSMENT/PLAN:                                                    ASSESSMENT:  1. Acute bronchitis with symptoms greater than 10 days    2. Malignant neoplasm of lower lobe of lung, unspecified laterality (H)        PLAN:  Orders Placed This Encounter               doxycycline (VIBRAMYCIN) 100 MG capsule       Patient Instructions   Take the doxycycline for 10 days. This should help with coughing up the phlegm and mucous      [facility]

## 2018-07-31 NOTE — MR AVS SNAPSHOT
"              After Visit Summary   7/31/2018    Juan Choudhary    MRN: [id number]           Patient Information     Date Of Birth          1945        Visit Information        Provider Department      7/31/2018 2:40 PM Brittany, GIRISH Copeland MD [facility]        Today's Diagnoses     Acute bronchitis with symptoms greater than 10 days    -  1      Care Instructions    Take the doxycycline for 10 days. This should help with coughing up the phlegm and mucous          Follow-ups after your visit        Who to contact     If you have questions or need follow up information about today's clinic visit or your schedule please contact [facility] directly at [phone].  Normal or non-critical lab and imaging results will be communicated to you by MyChart, letter or phone within 4 business days after the clinic has received the results. If you do not hear from us within 7 days, please contact the clinic through MyChart or phone. If you have a critical or abnormal lab result, we will notify you by phone as soon as possible.  Submit refill requests through [employer] or call your pharmacy and they will forward the refill request to us. Please allow 3 business days for your refill to be completed.          Additional Information About Your Visit        Care EveryWhere ID     This is your Care EveryWhere ID. This could be used by other organizations to access your [place] medical records  [id number]        Your Vitals Were     Pulse Temperature Height Pulse Oximetry BMI (Body Mass Index)       101 98  F (36.7  C) (Tympanic) 6' 2\" (1.88 m) 89% 22.57 kg/m2        Blood Pressure from Last 3 Encounters:   07/31/18 100/71   05/17/18 (!) 84/60   05/13/18 109/72    Weight from Last 3 Encounters:   07/31/18 175 lb 12.8 oz (79.7 kg)   05/17/18 175 lb 8 oz (79.6 kg)   05/13/18 171 lb 15.3 oz (78 kg)              Today, you had the following     No orders found for display         Today's Medication " Changes          These changes are accurate as of 7/31/18  3:05 PM.  If you have any questions, ask your nurse or doctor.               Start taking these medicines.        Dose/Directions    doxycycline 100 MG capsule   Commonly known as:  VIBRAMYCIN   Used for:  Acute bronchitis with symptoms greater than 10 days   Started by:  GIRISH Soto MD        Dose:  100 mg   Take 1 capsule (100 mg) by mouth 2 times daily   Quantity:  20 capsule   Refills:  0            Where to get your medicines      These medications were sent to [id number] [address]  [address], [facility] [address]     Phone:  [phone]     doxycycline 100 MG capsule                Primary Care Provider Office Phone # Fax #    GIRISH Soto [phone] [address] [facility] [address]        Goals        General    Being Active (pt-stated)     Notes - Note created  6/15/2018 12:34 PM by aJyesh Wick RN    Goal Statement: I will walk to the mailbox, back to the house and around inside the house 2 times a day.  Measure of Success: completes the walk 2 times a day.  Supportive Steps to Achieve: encouragement  Barriers: lung cancer  Strengths: motivated  Date to Achieve By: ongoing          Equal Access to Services     SATISH MCKENNA AH: Pebbles jennings Soana, waaxda luqadaha, qaybta kaalmada ademartinezda, mony mahajan. So [facility] [phone].    ATENCIÓN: Si habla español, tiene a lopez disposición servicios gratuitos de asistencia lingüística. Llame al [phone].    We comply with applicable federal civil rights laws and [place] laws. We do not discriminate on the basis of race, color, national origin, age, disability, sex, sexual orientation, or gender identity.            Thank you!     Thank you for choosing [facility]  for your care. Our goal is always to provide you with excellent care. Hearing back  from our patients is one way we can continue to improve our services. Please take a few minutes to complete the written survey that you may receive in the mail after your visit with us. Thank you!             Your Updated Medication List - Protect others around you: Learn how to safely use, store and throw away your medicines at www.disposemymeds.org.          This list is accurate as of 7/31/18  3:05 PM.  Always use your most recent med list.                   Brand Name Dispense Instructions for use Diagnosis    acidophilus tablet     30 tablet    Take 1 tablet by mouth daily    Diarrhea, unspecified type       ASPIRIN EC PO      Take 81 mg by mouth daily        B-12 1000 MCG Caps      Take 1,000 mcg by mouth daily        CENTRUM SILVER per tablet      Take 1 tablet by mouth daily (with lunch)        doxycycline 100 MG capsule    VIBRAMYCIN    20 capsule    Take 1 capsule (100 mg) by mouth 2 times daily    Acute bronchitis with symptoms greater than 10 days       NEW MED      Take 1 oz by mouth daily (with lunch) Tart cherry juice  - mixed with water        predniSONE 5 MG/5ML solution      Take 10 mg by mouth daily        VITAMIN D PO      Take 1,000 Units by mouth daily

## 2018-09-24 NOTE — PROGRESS NOTES
Clinic Care Coordination Contact    Clinic Care Coordination Contact  OUTREACH    Referral Information:  Referral Source: IP Handoff    Primary Diagnosis: Respiratory Disorders - other    Chief Complaint   Patient presents with     Clinic Care Coordination - Follow-up     RN CC        [place] Utilization:   Clinic Utilization  Difficulty keeping appointments:: No  Compliance Concerns: No  No-Show Concerns: No  No PCP office visit in Past Year: No  Utilization    Last refreshed: 9/16/2018  5:40 AM:  No Show Count (past year) 0       Last refreshed: 9/16/2018  5:40 AM:  ED visits 0       Last refreshed: 9/16/2018  5:40 AM:  Hospital admissions 1          Current as of: 9/16/2018  5:40 AM             Clinical Concerns:  Current Medical Concerns:  The patient is currently being treated for lung cancer by [facility].  The patient states that his breathing is not much worse just limiting the activities that he can do.  The patient was out chopping down brush when his wife brought him the phone.  He sounded somewhat winded on the phone although not real bad.  The patient denies chest pain, nausea, vomiting or fevers.      Current Behavioral Concerns: none noted currently.    Education Provided to patient: none   Pain  Pain (GOAL):: No  Health Maintenance Reviewed: Due/Overdue   Clinical Pathway: None    Medication Management:  Patient is knowledgeable on medications and is adherent.  No financial concerns reported at this time.       Functional Status:  Dependent ADLs:: Independent  Dependent IADLs:: Independent  Bed or wheelchair confined:: No  Mobility Status: Independent  Fallen 2 or more times in the past year?: No  Any fall with injury in the past year?: No    Living Situation:  Current living arrangement:: I live in a private home with spouse  Type of residence:: Private home - stairs    Diet/Exercise/Sleep:  Diet:: Regular  Inadequate nutrition (GOAL):: No  Food Insecurity: No  Tube Feeding: No  Exercise::  Yes  Days per week of moderate to strenuous exercise (like a brisk walk): 2  On average, minutes per day of exercise at this level: 10  How intense was your typical exercise? : Light (like stretching or slow walking)  Exercise Minutes per Week: 20  Inadequate activity/exercise (GOAL):: No  Significant changes in sleep pattern (GOAL): No    Transportation:  Transportation concerns (GOAL):: No  Transportation means:: Regular car     Psychosocial:  Gnosticist or spiritual beliefs that impact treatment:: No  Mental health DX:: No  Mental health management concern (GOAL):: No  Informal Support system:: Spouse, Family     Financial/Insurance:   Financial/Insurance concerns (GOAL):: No       Resources and Interventions:  Current Resources:    ;   Community Resources: None  Supplies used at home:: None  Equipment Currently Used at Home: none    Advance Care Plan/Directive  Advanced Care Plans/Directives on file:: No  Advanced Care Plan/Directive Status: Not Applicable    Referrals Placed: None     Goals:   Goals        General    Being Active (pt-stated)     Notes - Note created  6/15/2018 12:34 PM by Jayesh Ferrera, RN    Goal Statement: I will walk to the mailbox, back to the house and around inside the house 2 times a day.  Measure of Success: completes the walk 2 times a day.  Supportive Steps to Achieve: encouragement  Barriers: lung cancer  Strengths: motivated  Date to Achieve By: ongoing                Patient/Caregiver understanding: The patient has an excellent understanding of the disease process.    Outreach Frequency: monthly      Plan: 1).  The patient will continue to work on walking extending time and/or distance.  2).   The patient will make and attend all follow up appointments with the PCP and the specialists.   3).  Care Coordination to remain available for the patient to contact in the event of future needs.        Jayesh Ferrera, MSN, RN, PHN I Clinic Care Coordinator  [facility]  [facility]  Phone: [phone]  [email or web address]

## 2018-11-05 NOTE — LETTER
[facility] Home  Complex Care Plan  About Me  Patient Name:  Juan Choudhary    YOB: 1945  Age:     73 year old   Haily MRN:   [id number] Telephone Information:    Home Phone [phone]   Mobile Not on file.       Address:    [address] [place] [id number] Email address:  No e-mail address on record      Emergency Contact(s)  Name Relationship Lgl Grd Work Phone Home Phone Mobile Phone   1. JAVI CHOUDHARY A* Spouse   [phone]    2. ANG MATTHEWS Daughter   [phone]            Primary language:  English     needed? No   [employer]:  [phone] op. 1  Other communication barriers: None  Preferred Method of Communication:  Mail  Current living arrangement: I live in a private home with spouse  Mobility Status/ Medical Equipment: Independent    Health Maintenance  Health Maintenance Reviewed: Due/Overdue     My Access Plan  Medical Emergency 911   Primary Clinic Line [facility] - [phone]   24 Hour Appointment Line [phone] or  [phone]) (toll-free)   24 Hour Nurse Line [phone] (toll-free)   Preferred Urgent Care [facility] [phone]   Preferred Hospital [place], [place]  [phone]   Preferred Pharmacy [id number] - [address]     Behavioral Health Crisis Line The National Suicide Prevention Lifeline at 1-576.754.7481 or 911     My Care Team Members    Patient Care Team       Relationship Specialty Notifications Start End    Post, GIRISH Copeland MD PCP - General Family Practice  5/13/11     Phone: [phone] Fax: [phone] [address] [facility] [address]    Jayesh Wick, RN Lead Care Coordinator Primary Care - CC Admissions 5/14/18     Phone: [phone] Fax: [phone]                My Care Plans  Self Management and Treatment Plan  Goals and (Comments)  Goals        General    Being  Active (pt-stated)     Notes - Note created  6/15/2018 12:34 PM by Jayesh Wick, RN    Goal Statement: I will walk to the mailbox, back to the house and around inside the house 2 times a day.  Measure of Success: completes the walk 2 times a day.  Supportive Steps to Achieve: encouragement  Barriers: lung cancer  Strengths: motivated  Date to Achieve By: ongoing               Action Plans on File:                       Advance Care Plans/Directives Type:        My Medical and Care Information  Problem List   Patient Active Problem List   Diagnosis     ESOPHAGEAL REFLUX     Internal bleeding hemorrhoids     CARDIOVASCULAR SCREENING; LDL GOAL LESS THAN 160     Advanced directives, counseling/discussion     Health Care Home     Malignant neoplasm of lower lobe of right lung (H)     Right lower lobe pneumonia (H)     Lung cancer, lower lobe (H)     Adenocarcinoma of lung, right (H)     Pneumonia     CAP (community acquired pneumonia)      Current Medications and Allergies:  See printed Medication Report.    Care Coordination Start Date: 6/15/2018   Frequency of Care Coordination: monthly   Form Last Updated: 11/05/2018

## 2018-11-05 NOTE — PROGRESS NOTES
Clinic Care Coordination Contact    Situation: Patient chart reviewed by care coordinator.    Background: The patient has lung cancer being treated by [facility].    Assessment: The patient recently had a CT scan that was unchanged from the last one. He is attending follow up appointments and the records from outside clinics are being transferred in.     Plan/Recommendations: 1).  The patient will follow the plan of the PCP and specialists upcoming.  2).  Care Coordination to remain available for the patient to contact in the event of future needs.        Jayesh Ferrera, MSN, RN, PHN I Clinic Care Coordinator  [facility]  Phone: [phone]  [email or web address]

## 2018-12-05 NOTE — PROGRESS NOTES
Clinic Care Coordination Contact  Care Team Conversations    The patient is doing better, as he has not been hospitalized since last May.  He has returned to the Chemo schedule, with his only complaints being some constipation and discomfort in the blood vessel that the chemo was given in.  The patient feels that he is doing very well and is no longer in need of the scheduled calls from care coordination.  He would rather contact the care coordinator with concerns.  The Primary Care Coordination RN assured that the patient has the correct contact information.  Therefore the case was closed at this time.    Care Coordination to remain available for the patient to contact in the event of future needs. No follow up planned at this time.       Jayesh Ferrera, MSN, RN, PHN I Clinic Care Coordinator  [facility]  Phone: [phone]  [email or web address]

## 2018-12-29 NOTE — ED PROVIDER NOTES
"  History     Chief Complaint   Patient presents with     Dizziness     taking levaquin for suspected pneumonia, pt being treated for lung ca. feeling weak and dizzy  since 12/25, states he feels like he's getting worse     HPI  Juan Choudhary is a 73 year old male with a history significant for malignant neoplasm of the lower right lung status post lobectomy at [facility] in 02/2011, right lower lobe pneumonia, and internal bleeding hemorrhoids who presents to the ED for evaluation of generalized weakness. The patient was evaluated at [facility] for possible pneumonia on 12/27/2018 and was given a course of Levaquin for treatment. He also had a chest x-ray completed at that time. The patient states that he has taken Levaquin for the past two days and believes that he has been experiencing side effects of the medication. Four days ago, the patient felt fatigued. He has since had nausea, weakness, fatigue, decreased appetite, and light headedness. The patient also reports an intermittent fever which was reportedly 103 F four days ago, but has been decreasing since starting treatment for pneumonia. He adds that he has not slept for the past two days. The patient acknowledges multiple ongoing symptoms including productive cough - improving with treatment. ongoing longterm shortness of breath. He denies any pain with breathing or coughing. The patient has experienced neuropathy \"for a long time\" but denies any recent new muscle or nerve pain on levaquin.      Problem List:    Patient Active Problem List    Diagnosis Date Noted     Pneumonia 05/11/2018     Priority: Medium     CAP (community acquired pneumonia) 05/11/2018     Priority: Medium     Adenocarcinoma of lung, right (H) 09/08/2017     Priority: Medium     Right lower lobectomy at [facility] 2/11       Malignant neoplasm of lower lobe of right lung (H) 01/13/2017     Priority: Medium     Right lower lobe pneumonia (H) 01/13/2017     Priority: " "Medium     Lung cancer, lower lobe (H) 01/13/2017     Priority: Medium     Health Care Home 12/05/2013     Priority: Medium     Given basic care plan on the visit of 12/5/13       Advanced directives, counseling/discussion 12/07/2012     Priority: Medium     Patient does not have an Advance/Health Care Directive (HCD), given \"What is Advance Care Planning?\" flyer.    Mónica Maurice  December 7, 2012         CARDIOVASCULAR SCREENING; LDL GOAL LESS THAN 160 10/31/2010     Priority: Medium     Internal bleeding hemorrhoids 01/06/2009     Priority: Medium     ESOPHAGEAL REFLUX 02/14/2008     Priority: Medium     Scope Feb 2008          Past Medical History:    Past Medical History:   Diagnosis Date     Esophageal reflux        Past Surgical History:    Past Surgical History:   Procedure Laterality Date     HERNIA REPAIR, INGUINAL RT/LT  2004     LOBECTOMY  2/11    right lower lobe, for adenoca     SURGICAL HISTORY OF -   1/11    Bronchoscopy ([facility])     SURGICAL HISTORY OF -   2/10    Bronchoscopy [facility]     VASECTOMY  1980       Family History:    Family History   Problem Relation Age of Onset     Cancer Mother         lung     Heart Disease Father      Heart Disease Sister      Prostate Cancer Brother      Melanoma No family hx of        Social History:  Marital Status:   [2]  Social History     Tobacco Use     Smoking status: Never Smoker     Smokeless tobacco: Never Used   Substance Use Topics     Alcohol use: Yes     Comment: socially     Drug use: No        Medications:      ASPIRIN EC PO   Cholecalciferol (VITAMIN D PO)   Cyanocobalamin (B-12) 1000 MCG CAPS   doxycycline (VIBRAMYCIN) 100 MG capsule   doxycycline (VIBRAMYCIN) 100 MG capsule   Lactobacillus (ACIDOPHILUS) tablet   Multiple Vitamins-Minerals (CENTRUM SILVER) per tablet   NEW MED   predniSONE 5 MG/5ML solution         Review of Systems   Constitutional: Positive for appetite change, fatigue and fever. Negative for chills and diaphoresis. "   HENT: Negative for ear pain, sinus pressure and sore throat.    Eyes: Negative for visual disturbance.   Respiratory: Negative for cough, shortness of breath and wheezing.    Cardiovascular: Negative for chest pain and palpitations.   Gastrointestinal: Positive for nausea. Negative for abdominal pain, blood in stool, constipation, diarrhea and vomiting.   Genitourinary: Negative for dysuria, frequency and urgency.   Musculoskeletal: Negative for myalgias.   Skin: Negative for rash.   Neurological: Positive for dizziness and weakness. Negative for headaches.   Psychiatric/Behavioral: Positive for sleep disturbance.   All other systems reviewed and are negative.      Physical Exam   BP: 109/70  Pulse: 85  Temp: 97.7  F (36.5  C)  Resp: 16  Weight: 78 kg (172 lb)  SpO2: 98 %      Physical Exam   Constitutional: He is oriented to person, place, and time. No distress.   HENT:   Right Ear: Tympanic membrane normal.   Left Ear: Tympanic membrane normal.   Mouth/Throat: Oropharynx is clear and moist and mucous membranes are normal.   Eyes: Conjunctivae are normal.   Neck: Full passive range of motion without pain. Neck supple.   No significant adenopathy.   Cardiovascular: Normal rate and regular rhythm.   No murmur heard.  Pulmonary/Chest: Effort normal. No stridor. No respiratory distress. He has no wheezes. He has rhonchi (scattered). He has no rales.   Patient has a normal respiratory rate but is tachypneic at times.   Abdominal: Soft. Normal appearance and bowel sounds are normal. He exhibits no distension. There is no rebound and no guarding.   Musculoskeletal: He exhibits no edema.   Neurological: He is alert and oriented to person, place, and time.   Skin: No rash noted. He is not diaphoretic.       ED Course        Procedures               EKG Interpretation:      Interpreted by Reyes Phan MD  Time reviewed: 10:50  Symptoms at time of EKG: Weakness.  Rhythm: Normal sinus rhythm of 79 beats per minute.  Rate:  Normal.  Axis: Normal.  Ectopy: None.  Conduction: Normal except for RSR prime in lead v1.  ST Segments/ T Waves: No ST-T wave changes.  Q Waves: None.  Comparison to prior: Unchanged from prior EKG done on 9/28/2015.    Clinical Impression: normal EKG    Critical Care time:  none               No results found for this or any previous visit (from the past 24 hour(s)).    Medications - No data to display      10:50 AM Patient assessed.      Assessments & Plan (with Medical Decision Making)     MDM: Juan Choudhary is a 73 year old male who presents with a history of metastatic lung cancer having completed chemotherapy and was seen in oncology clinic for pneumonia on 12/27/2018.  His chest x-ray results are reviewed in the record and demonstrated his chronic change related to his lung mass but no obvious new infiltrates seen.  He was started on Levaquin and was given a handout on side effects and these included fatigue, weakness, decreased appetite, nausea and lightheadedness and he presented because he was concerned that the Levaquin was causing his symptoms.  He also describes insomnia for the last couple of nights.  His vital signs are reassuring and his temperature curve is decreased on Levaquin.  He is in no respiratory distress normal oxygen saturation.  He has no musculoskeletal or neurologic changes related to the Levaquin.  His symptoms are nonspecific and unlikely due to the Levaquin but rather may be due to his most recent respiratory infection which appears to be improving.  He has no significant change in his respiratory status.  We discussed the findings on his most recent testing.  I am unable to see his recent lab tests but he tells me that these were checked at the most recent visit.    At this point with improving symptoms I would not recommend additional testing.  I did however offer that we could perform this or treat him symptomatically for his insomnia and nausea, continuing on his current  antibiotics and following up in clinic early this coming week.  If he is worsening he was to return.  He agrees with this plan as noted below with precautions for return.    I have reviewed the nursing notes.    I have reviewed the findings, diagnosis, plan and need for follow up with the patient.             Medication List      There are no discharge medications for this visit.         Final diagnoses:   Insomnia, unspecified type - use mnelatonin 3 mg tab at bedtime. if still unable to sleep may use the ambien   Nausea - jmay use zofran as needed for nausea, vomitng. if opersistent or worsening, then obtain labs ibncluding liver testing   Malignant neoplasm of lower lobe of lung, unspecified laterality (H)   Pneumonia of right lower lobe due to infectious organism (H) - I doubt levaquin is related to current symptoms. complete the antibiotic course     This document serves as a record of the services and decisions personally performed and made by Reyes Phan MD. It was created on HIS/HER behalf by Tisha Lui, a trained medical scribe. The creation of this document is based the provider's statements to the medical scribe.  Tisha Lui 11:00 AM 12/29/2018    Provider:   The information in this document, created by the medical scribe for me, accurately reflects the services I personally performed and the decisions made by me. I have reviewed and approved this document for accuracy prior to leaving the patient care area.  Reyes Phan MD 11:00 AM 12/29/2018 12/29/2018   [facility] EMERGENCY DEPARTMENT     Reyes Phan MD  12/29/18 1223

## 2018-12-29 NOTE — DISCHARGE INSTRUCTIONS
ICD-10-CM    1. Insomnia, unspecified type G47.00     use mnelatonin 3 mg tab at bedtime. if still unable to sleep may use the ambien   2. Nausea R11.0     jmay use zofran as needed for nausea, vomitng. if opersistent or worsening, then obtain labs ibncluding liver testing   3. Malignant neoplasm of lower lobe of lung, unspecified laterality (H)Chronic C34.30    4. Pneumonia of right lower lobe due to infectious organism (H)Acute J18.1     I doubt levaquin is related to current symptoms. complete the antibiotic course

## 2018-12-29 NOTE — ED AVS SNAPSHOT
[facility] Emergency Department  [address] [facility] [address]  Phone:  [phone]  Fax:  [phone]                                    Juan Choudhary   MRN: [id number]    Department:  [facility] Emergency Department   Date of Visit:  12/29/2018           After Visit Summary Signature Page    I have received my discharge instructions, and my questions have been answered. I have discussed any challenges I see with this plan with the nurse or doctor.    ..........................................................................................................................................  Patient/Patient Representative Signature      ..........................................................................................................................................  Patient Representative Print Name and Relationship to Patient    ..................................................               ................................................  Date                                   Time    ..........................................................................................................................................  Reviewed by Signature/Title    ...................................................              ..............................................  Date                                               Time          22EPIC Rev 08/18

## 2018-12-29 NOTE — ED NOTES
Pt here with weakness, nausea and some dizziness that all started 2 days ago when he started Levaquin for suspected pneumonia. Pt has a hx of lung cancer. States that he hasn't been able to eat for 2 days due to nausea, denies any vomiting or diarrhea. Has had difficulty sleeping. Feels that he is getting worse.

## 2019-01-01 ENCOUNTER — TELEPHONE (OUTPATIENT)
Dept: FAMILY MEDICINE | Facility: CLINIC | Age: 74
End: 2019-01-01

## 2019-01-01 ENCOUNTER — TRANSFERRED RECORDS (OUTPATIENT)
Dept: HEALTH INFORMATION MANAGEMENT | Facility: CLINIC | Age: 74
End: 2019-01-01

## 2019-01-01 ENCOUNTER — DOCUMENTATION ONLY (OUTPATIENT)
Dept: OTHER | Facility: CLINIC | Age: 74
End: 2019-01-01

## 2019-01-01 ENCOUNTER — AMBULATORY - HEALTHEAST (OUTPATIENT)
Dept: OTHER | Facility: CLINIC | Age: 74
End: 2019-01-01

## 2019-01-01 ENCOUNTER — MEDICAL CORRESPONDENCE (OUTPATIENT)
Dept: HEALTH INFORMATION MANAGEMENT | Facility: CLINIC | Age: 74
End: 2019-01-01

## 2019-01-01 ENCOUNTER — ONCOLOGY VISIT (OUTPATIENT)
Dept: ONCOLOGY | Facility: CLINIC | Age: 74
End: 2019-01-01
Attending: FAMILY MEDICINE
Payer: COMMERCIAL

## 2019-01-01 ENCOUNTER — HOSPITAL ENCOUNTER (OUTPATIENT)
Dept: CT IMAGING | Facility: CLINIC | Age: 74
Discharge: HOME | End: 2019-01-02
Attending: INTERNAL MEDICINE | Admitting: INTERNAL MEDICINE
Payer: COMMERCIAL

## 2019-01-01 ENCOUNTER — PRE VISIT (OUTPATIENT)
Dept: ONCOLOGY | Facility: CLINIC | Age: 74
End: 2019-01-01

## 2019-01-01 VITALS
HEIGHT: 74 IN | DIASTOLIC BLOOD PRESSURE: 80 MMHG | TEMPERATURE: 96.8 F | RESPIRATION RATE: 24 BRPM | BODY MASS INDEX: 22.92 KG/M2 | WEIGHT: 178.6 LBS | SYSTOLIC BLOOD PRESSURE: 108 MMHG | OXYGEN SATURATION: 88 % | HEART RATE: 100 BPM

## 2019-01-01 DIAGNOSIS — C34.90 NON-SMALL CELL LUNG CANCER (H): ICD-10-CM

## 2019-01-01 DIAGNOSIS — C34.31 MALIGNANT NEOPLASM OF LOWER LOBE OF RIGHT LUNG (H): ICD-10-CM

## 2019-01-01 DIAGNOSIS — C34.91 ADENOCARCINOMA OF LUNG, RIGHT (H): Primary | ICD-10-CM

## 2019-01-01 DIAGNOSIS — C34.30 MALIGNANT NEOPLASM OF LOWER LOBE OF LUNG, UNSPECIFIED LATERALITY (H): ICD-10-CM

## 2019-01-01 DIAGNOSIS — J20.9 ACUTE BRONCHITIS WITH SYMPTOMS GREATER THAN 10 DAYS: Primary | ICD-10-CM

## 2019-01-01 DIAGNOSIS — C34.91 ADENOCARCINOMA OF LUNG, RIGHT (H): ICD-10-CM

## 2019-01-01 LAB
CREAT BLD-MCNC: 0.9 MG/DL (ref 0.66–1.25)
GFR SERPL CREATININE-BSD FRML MDRD: 83 ML/MIN/{1.73_M2}

## 2019-01-01 PROCEDURE — 82565 ASSAY OF CREATININE: CPT

## 2019-01-01 PROCEDURE — G0463 HOSPITAL OUTPT CLINIC VISIT: HCPCS

## 2019-01-01 PROCEDURE — 74177 CT ABD & PELVIS W/CONTRAST: CPT

## 2019-01-01 PROCEDURE — 25000128 H RX IP 250 OP 636: Performed by: RADIOLOGY

## 2019-01-01 PROCEDURE — 71260 CT THORAX DX C+: CPT

## 2019-01-01 PROCEDURE — 99205 OFFICE O/P NEW HI 60 MIN: CPT | Performed by: FAMILY MEDICINE

## 2019-01-01 PROCEDURE — 25000125 ZZHC RX 250: Performed by: RADIOLOGY

## 2019-01-01 RX ORDER — ALBUTEROL SULFATE 90 UG/1
2 AEROSOL, METERED RESPIRATORY (INHALATION) EVERY 6 HOURS
Qty: 8.5 G | Refills: 11 | Status: SHIPPED | OUTPATIENT
Start: 2019-01-01

## 2019-01-01 RX ORDER — DEXAMETHASONE 2 MG/1
2 TABLET ORAL DAILY
COMMUNITY

## 2019-01-01 RX ORDER — ALBUTEROL SULFATE 90 UG/1
2 AEROSOL, METERED RESPIRATORY (INHALATION) EVERY 6 HOURS
COMMUNITY
End: 2019-01-01

## 2019-01-01 RX ORDER — OMEGA-3-ACID ETHYL ESTERS 900 MG/1
CAPSULE, LIQUID FILLED ORAL
COMMUNITY

## 2019-01-01 RX ORDER — IOPAMIDOL 755 MG/ML
84 INJECTION, SOLUTION INTRAVASCULAR ONCE
Status: COMPLETED | OUTPATIENT
Start: 2019-01-01 | End: 2019-01-01

## 2019-01-01 RX ADMIN — SODIUM CHLORIDE 61 ML: 9 INJECTION, SOLUTION INTRAVENOUS at 08:13

## 2019-01-01 RX ADMIN — IOPAMIDOL 84 ML: 755 INJECTION, SOLUTION INTRAVENOUS at 08:12

## 2019-01-01 ASSESSMENT — MIFFLIN-ST. JEOR: SCORE: 1620.12

## 2019-01-01 ASSESSMENT — PAIN SCALES - GENERAL: PAINLEVEL: NO PAIN (0)

## 2019-04-09 NOTE — TELEPHONE ENCOUNTER
RECORDS STATUS - ALL OTHER DIAGNOSIS      RECORDS RECEIVED FROM: Internal/External   DATE RECEIVED: 4.10.19   NOTES STATUS DETAILS   OFFICE NOTE from referring provider N/A    OFFICE NOTE from medical oncologist Complete 3.28.19 [facility]  2.21.19  1.31.19  More in EPIC   DISCHARGE SUMMARY from hospital N/A    DISCHARGE REPORT from the ER N/A    OPERATIVE REPORT External 3.26.15 Biopsy performed at [facility]   MEDICATION LIST Complete [facility]   CLINICAL TRIAL TREATMENTS TO DATE N/A    LABS     PATHOLOGY REPORTS N/A    ANYTHING RELATED TO DIAGNOSIS N/A    GENONOMIC TESTING     TYPE: N/A    IMAGING (NEED IMAGES & REPORT)     CT SCANS Internal 1.2.19  10.3.18  5.11.18  12.17.10  2.17.10   MRI N/A    MAMMO N/A    ULTRASOUND N/A    PET N/A

## 2019-04-10 NOTE — LETTER
4/10/2019         RE: Juan Choudhary  [address] [address] [place] [id number]        Dear Colleague,    Thank you for referring your patient, Juan Choudhary, to the [facility]. Please see a copy of my visit note below.    Palliative Care Outpatient Clinic Consultation Note    Patient:  Juan Choudhary    Chief Complaint:   Juan Choudhary 73 year old male who is presenting to the palliative medicine clinic today at the request of Dr Girish Pelayo for a palliative care consultation secondary to non-small cell lung cancer.   The patient's primary care provider is:  Andrea Chau.     History of Present Illness:  This pleasant gentleman presented to clinic along with his wife.  He has been dealing with lung cancer since 2011.  He has been through multiple treatment programs.  He says his cancer has been steadily progressing throughout that time.  He has now exhausted all significant treatments and has no curative treatments planned.  Has been told that he may have 6-12 months to live and that the cancer will progress.    Distressing Symptom/s:  His only significant symptom now is dyspnea with exertion.  He is using supplemental oxygen which helps.  He is having very minimal pain, not enough to even use any medications.    Patient's Disease Understanding: He understands he has a terminal disease    Coping: He is coping quite well though he is a bit frustrated that he can get chores done and feels he has a lot to do and the shortness of breath limits his ability.    Social History  Living Situation: He lives in a [facility] home and [place] with his wife  Children: 4 grown children, 2 live nearby  Actual/Potential Caregiver(s): Wife  Support System: Family and some friends  Occupation: Retired   Hobbies: Motorcycle racing  Substance Use/History of misuse: None  Financial Concerns: None  Spiritual Background:   Spiritual Concerns/Needs:   Social History     Tobacco Use     Smoking status: Never  Smoker     Smokeless tobacco: Never Used   Substance Use Topics     Alcohol use: Yes     Comment: socially     Drug use: No       Family History  Family History   Problem Relation Age of Onset     Cancer Mother         lung     Heart Disease Father      Heart Disease Sister      Prostate Cancer Brother      Melanoma No family hx of      Patient's Involvement with Prior History of Serious Illness in Family:     Advance Care Planning:  Advance Directive:      Where is written copy located:   Health Care Agent Contact Information:   POLST:       Allergies   Allergen Reactions     Azithromycin Hives and Diarrhea     Sulfa Drugs Other (See Comments)     Over used in childhood     Penicillins Rash     Mild rash, cephlaosporins okay     Current Outpatient Medications   Medication Sig Dispense Refill     albuterol (PROAIR HFA/PROVENTIL HFA/VENTOLIN HFA) 108 (90 Base) MCG/ACT inhaler Inhale 2 puffs into the lungs every 6 hours 8.5 g 11     ASPIRIN EC PO Take 81 mg by mouth daily       Cholecalciferol (VITAMIN D PO) Take 1,000 Units by mouth daily        Cyanocobalamin (B-12) 1000 MCG CAPS Take 1,000 mcg by mouth daily       dexamethasone (DECADRON) 2 MG tablet Take 2 mg by mouth daily.       Multiple Vitamins-Minerals (CENTRUM SILVER) per tablet Take 1 tablet by mouth daily (with lunch)        NEW MED Take 1 oz by mouth daily (with lunch) Tart cherry juice  - mixed with water       Omega-3-acid Ethyl Esters (FISH OIL OMEGA-3 FATTY ACID) 320MG/ML oral suspension        omeprazole (PRILOSEC) 20 MG DR capsule Take 20 mg by mouth daily       predniSONE 5 MG/5ML solution Take 10 mg by mouth daily       Past Medical History:   Diagnosis Date     Esophageal reflux     Gastroesophageal reflux     Past Surgical History:   Procedure Laterality Date     HERNIA REPAIR, INGUINAL RT/LT  2004     LOBECTOMY  2/11    right lower lobe, for adenoca     SURGICAL HISTORY OF -   1/11    Bronchoscopy ([facility])     SURGICAL HISTORY OF -   2/10     "Bronchoscopy [facility]     VASECTOMY  1980       REVIEW OF SYSTEMS:   ROS: 10 point ROS neg other than the symptoms noted above in the HPI and here:  Palliative Symptom Review (0=no symptom/no concern, 1=mild, 2=moderate, 3=severe):      Pain: 0      Fatigue: 0      Nausea: 0      Constipation: 0      Diarrhea: 0      Depressive Symptoms: 0      Anxiety: 0      Drowsiness: 0      Poor Appetite: 0      Shortness of Breath: 2      Insomnia: 0      Other: 0      Overall (0 good/no concerns, 3 very poor):  0      /80 (BP Location: Right arm, Patient Position: Sitting, Cuff Size: Adult Regular)   Pulse 100   Temp 96.8  F (36  C) (Tympanic)   Resp 24   Ht 1.88 m (6' 2.02\")   Wt 81 kg (178 lb 9.6 oz)   SpO2 (P) 92%   BMI 22.92 kg/m     LUNGS: Scattered rhonchi, worse on the left than the right  CV: RRR without murmur  ABD: BS+, soft, nontender, no masses, no hepatosplenomegaly  EXTREMITIES: without joint tenderness, swelling or erythema.  No muscle tenderness or abnormality.  SKIN: No rashes or abnormalities  NEURO:non focal exam      Impressions:  Palliative Performance Score:  90  Decision Making Capacity:  intact        Recommendations & Counseling:    Adenocarcinoma of lung, right (H)    We discussed his palliative care needs and possible future needs.  Right now he is doing well with supplemental oxygen and inhalers.    He will recheck here in 6-8 weeks, sooner if new symptoms develop    We did discuss hospice and I explained to him that he is eligible for that and he feels he is not mentally ready for that at this point but he is aware of that option.          Oncology Rooming Note    April 10, 2019 9:08 AM   Juan Choudhary is a 73 year old male who presents for:    Chief Complaint   Patient presents with     Oncology Clinic Visit     New Patient - Non-small cell lung cancer      Initial Vitals: /80 (BP Location: Right arm, Patient Position: Sitting, Cuff Size: Adult Regular)   Pulse 100   Temp 96.8 " " F (36  C) (Tympanic)   Resp 24   Ht 1.88 m (6' 2.02\")   Wt 81 kg (178 lb 9.6 oz)   SpO2 (!) 88%   BMI 22.92 kg/m    Estimated body mass index is 22.92 kg/m  as calculated from the following:    Height as of this encounter: 1.88 m (6' 2.02\").    Weight as of this encounter: 81 kg (178 lb 9.6 oz). Body surface area is 2.06 meters squared.  No Pain (0) Comment: Data Unavailable   No LMP for male patient.  Allergies reviewed: Yes  Medications reviewed: Yes    Medications: Medication refills not needed today.  Pharmacy name entered into [facility]:      [facility] [place], [place] - [address] [place] [address]    Clinical concerns New Patient - Non-small cell lung cancer.       Betty Michel [facility]                Again, thank you for allowing me to participate in the care of your patient.        Sincerely,        Andrea Chau MD    "

## 2019-04-10 NOTE — PROGRESS NOTES
"Oncology Rooming Note    April 10, 2019 9:08 AM   Juan Choudhary is a 73 year old male who presents for:    Chief Complaint   Patient presents with     Oncology Clinic Visit     New Patient - Non-small cell lung cancer      Initial Vitals: /80 (BP Location: Right arm, Patient Position: Sitting, Cuff Size: Adult Regular)   Pulse 100   Temp 96.8  F (36  C) (Tympanic)   Resp 24   Ht 1.88 m (6' 2.02\")   Wt 81 kg (178 lb 9.6 oz)   SpO2 (!) 88%   BMI 22.92 kg/m   Estimated body mass index is 22.92 kg/m  as calculated from the following:    Height as of this encounter: 1.88 m (6' 2.02\").    Weight as of this encounter: 81 kg (178 lb 9.6 oz). Body surface area is 2.06 meters squared.  No Pain (0) Comment: Data Unavailable   No LMP for male patient.  Allergies reviewed: Yes  Medications reviewed: Yes    Medications: Medication refills not needed today.  Pharmacy name entered into [facility]:      [facility] [place], [place] - [address] [facility]    Clinical concerns New Patient - Non-small cell lung cancer.       Betty Michel, FELISHA              "

## 2019-04-15 NOTE — TELEPHONE ENCOUNTER
[facility] now requests orders and shares plan of care/discharge summaries for some patients through [employer].  Please REPLY TO THIS MESSAGE OR ROUTE BACK TO THE AUTHOR in order to give authorization for orders when needed.  This is considered a verbal order, you will still receive a faxed copy of orders for signature.  Thank you for your assistance in improving collaboration for our patients.    ORDER    Patient reported O2 sats in low 70's, has portable oxygen machine he purchased but still oxygen level is low 80's with use of 2 L continuous on portable machine. O2 sats during visit today 86 to 87 percent with 2 L continuous oxygen with portable oxygen machine. Patient tried deep breathing but causes him to cough and he can no longer deep breath. Patient reported you had talked with him about getting a concentrator if necessary. Patient and wife Sharon Goncalves believe he needs a concentrator. Can you please put in orders for this as well as the supplies, tubing. NC connectors so patient can get this delivered to the home asap. Please call with any questions or concerns.     Thank you,  Becky VYAS

## 2019-04-16 NOTE — TELEPHONE ENCOUNTER
Face to face visit 4/10/19 needs to state o2 88% on room air while at rest. Brian is stating that it is for insurance reasons. Brigette is who called from [facility] [place] [phone].   Fax [phone]    Chasity James on 4/16/2019 at 4:02 PM

## 2019-04-19 NOTE — TELEPHONE ENCOUNTER
Appears this note was addended but then this message got closed and never routed back to the care team. Becky from [facility] called this morning to ask if this had been done and faxed to [facility] as the patient has had a couple of rough nights and needs this concentrator. I will fax the addended visit from 4/10/19 over to [facility] now.     I spoke with Brigette at [facility] and she did receive the fax and will get this in process now. I informed Becky I did this and she will let Juan's wife know.    Arin Bautista, RN

## 2019-04-25 NOTE — TELEPHONE ENCOUNTER
Form needs completion/signature for  -[facility] [place] Certificate of Medical Necessity for Oxygen.   Paperwork placed in forms box.    Christel Betts  [facility]  [facility]

## 2019-04-26 NOTE — TELEPHONE ENCOUNTER
Form signed and faxed to [facility] [phone].  Christel Betts  Clinic [facility] [place] [facility]

## 2019-04-29 ENCOUNTER — MEDICAL CORRESPONDENCE (OUTPATIENT)
Dept: HEALTH INFORMATION MANAGEMENT | Facility: CLINIC | Age: 74
End: 2019-04-29

## 2021-06-02 ENCOUNTER — RECORDS - HEALTHEAST (OUTPATIENT)
Dept: ADMINISTRATIVE | Facility: CLINIC | Age: 76
End: 2021-06-02

## 2021-06-11 NOTE — PROGRESS NOTES
RESPIRATORY CARE NOTE     Patient Name: Juan Choudhary  Today's Date: 6/6/2017     Complete PFT done. Pt performed tests with good effort, 2.5 mg Albuterol neb given. Test results meet ATS criteria. Results scanned into epic. Pt left in no distress.     Note:  Patient had a RLL Lobectomy in 2/11/2011, on & off chemo-therapy for 4-5 years.    Pema Thompson